# Patient Record
Sex: MALE | Race: WHITE | NOT HISPANIC OR LATINO | Employment: FULL TIME | URBAN - NONMETROPOLITAN AREA
[De-identification: names, ages, dates, MRNs, and addresses within clinical notes are randomized per-mention and may not be internally consistent; named-entity substitution may affect disease eponyms.]

---

## 2022-04-19 ENCOUNTER — TELEPHONE (OUTPATIENT)
Dept: FAMILY MEDICINE CLINIC | Facility: CLINIC | Age: 57
End: 2022-04-19

## 2022-06-03 RX ORDER — AMLODIPINE BESYLATE 5 MG/1
TABLET ORAL
Qty: 90 TABLET | Refills: 0 | Status: SHIPPED | OUTPATIENT
Start: 2022-06-03 | End: 2022-09-06

## 2022-06-03 RX ORDER — MONTELUKAST SODIUM 10 MG/1
TABLET ORAL
Qty: 90 TABLET | Refills: 0 | Status: SHIPPED | OUTPATIENT
Start: 2022-06-03 | End: 2022-10-05

## 2022-09-01 RX ORDER — MONTELUKAST SODIUM 10 MG/1
TABLET ORAL
Qty: 90 TABLET | Refills: 0 | OUTPATIENT
Start: 2022-09-01

## 2022-09-01 RX ORDER — OMEPRAZOLE 20 MG/1
CAPSULE, DELAYED RELEASE ORAL
Qty: 90 CAPSULE | Refills: 0 | Status: SHIPPED | OUTPATIENT
Start: 2022-09-01 | End: 2022-10-05 | Stop reason: SDUPTHER

## 2022-09-01 RX ORDER — AMLODIPINE BESYLATE 5 MG/1
TABLET ORAL
Qty: 90 TABLET | Refills: 0 | OUTPATIENT
Start: 2022-09-01

## 2022-09-06 RX ORDER — AMLODIPINE BESYLATE 5 MG/1
TABLET ORAL
Qty: 30 TABLET | Refills: 0 | Status: SHIPPED | OUTPATIENT
Start: 2022-09-06 | End: 2022-10-05 | Stop reason: SDUPTHER

## 2022-10-05 ENCOUNTER — OFFICE VISIT (OUTPATIENT)
Dept: FAMILY MEDICINE CLINIC | Facility: CLINIC | Age: 57
End: 2022-10-05

## 2022-10-05 VITALS
BODY MASS INDEX: 26.84 KG/M2 | OXYGEN SATURATION: 97 % | WEIGHT: 171 LBS | HEIGHT: 67 IN | DIASTOLIC BLOOD PRESSURE: 86 MMHG | SYSTOLIC BLOOD PRESSURE: 126 MMHG | HEART RATE: 68 BPM

## 2022-10-05 DIAGNOSIS — D17.1 LIPOMA OF LATERAL CHEST WALL: ICD-10-CM

## 2022-10-05 DIAGNOSIS — Z13.1 DIABETES MELLITUS SCREENING: ICD-10-CM

## 2022-10-05 DIAGNOSIS — K21.9 GERD WITHOUT ESOPHAGITIS: ICD-10-CM

## 2022-10-05 DIAGNOSIS — M54.2 CERVICALGIA: ICD-10-CM

## 2022-10-05 DIAGNOSIS — I10 HYPERTENSION, ESSENTIAL: ICD-10-CM

## 2022-10-05 DIAGNOSIS — E78.2 HYPERLIPIDEMIA, MIXED: ICD-10-CM

## 2022-10-05 DIAGNOSIS — Z00.00 GENERAL MEDICAL EXAM: Primary | ICD-10-CM

## 2022-10-05 DIAGNOSIS — Z11.59 NEED FOR HEPATITIS C SCREENING TEST: ICD-10-CM

## 2022-10-05 DIAGNOSIS — Z12.5 PROSTATE CANCER SCREENING: ICD-10-CM

## 2022-10-05 DIAGNOSIS — Z12.11 SCREENING FOR COLON CANCER: ICD-10-CM

## 2022-10-05 PROBLEM — J30.1 SEASONAL ALLERGIC RHINITIS DUE TO POLLEN: Status: RESOLVED | Noted: 2022-10-05 | Resolved: 2022-10-05

## 2022-10-05 PROBLEM — J45.20 MILD INTERMITTENT ASTHMA WITHOUT COMPLICATION: Status: RESOLVED | Noted: 2022-10-05 | Resolved: 2022-10-05

## 2022-10-05 PROBLEM — J45.20 MILD INTERMITTENT ASTHMA WITHOUT COMPLICATION: Status: ACTIVE | Noted: 2022-10-05

## 2022-10-05 PROBLEM — J30.1 SEASONAL ALLERGIC RHINITIS DUE TO POLLEN: Status: ACTIVE | Noted: 2022-10-05

## 2022-10-05 PROBLEM — J30.9 ALLERGIC RHINITIS: Status: ACTIVE | Noted: 2022-10-05

## 2022-10-05 PROCEDURE — 36415 COLL VENOUS BLD VENIPUNCTURE: CPT | Performed by: FAMILY MEDICINE

## 2022-10-05 PROCEDURE — 99396 PREV VISIT EST AGE 40-64: CPT | Performed by: FAMILY MEDICINE

## 2022-10-05 RX ORDER — AMLODIPINE BESYLATE 5 MG/1
5 TABLET ORAL DAILY
Qty: 90 TABLET | Refills: 1 | Status: SHIPPED | OUTPATIENT
Start: 2022-10-05 | End: 2023-02-14 | Stop reason: SDUPTHER

## 2022-10-05 RX ORDER — OMEPRAZOLE 20 MG/1
20 CAPSULE, DELAYED RELEASE ORAL DAILY
Qty: 90 CAPSULE | Refills: 1 | Status: SHIPPED | OUTPATIENT
Start: 2022-10-05 | End: 2023-02-14 | Stop reason: SDUPTHER

## 2022-10-05 RX ORDER — MELOXICAM 15 MG/1
TABLET ORAL
Qty: 30 TABLET | Refills: 3 | Status: SHIPPED | OUTPATIENT
Start: 2022-10-05 | End: 2023-02-14 | Stop reason: SDUPTHER

## 2022-10-05 NOTE — ASSESSMENT & PLAN NOTE
Reviewed health maintenance and screening.    Ordered Cologuard.  Declines colonoscopy.    Awaiting fasting labs with screening PSA and hep C screening.

## 2022-10-05 NOTE — ASSESSMENT & PLAN NOTE
Doing well with Norvasc.    Initial blood pressure was up but we rechecked this mid visit and it improved significantly.  He does continue with blood pressure checks at home periodically and we discussed goal of keeping this under 140/90.

## 2022-10-05 NOTE — PROGRESS NOTES
"Chief Complaint  Hypertension (Needing medication refilled)    Subjective    History of Present Illness:  Edward Knowles is a 56 y.o. male who presents today for physical exam and medication refills.    He is fasting to get past-due blood work up-to-date.  It has been about 3 years since his last labs due to the COVID pandemic.    His lipids were elevated with last labs and he would like to work on diet and exercise before medications if possible.    GERD stable with omeprazole.  No dysphagia.    Using Mobic infrequently for neck pain.  Understands long-term risks with NSAIDs and Mobic.  Understands not to take any other NSAIDs over-the-counter if he is using Mobic.    Declines vaccination update-discussed flu, Shingrix, Prevnar 20, Tdap, and COVID 19    Declines colonoscopy but willing to get Cologuard done.    Would like screening PSA with fasting labs.    Willing to get hep C screening done.    Objective   Vital Signs:   /86   Pulse 68   Ht 170.2 cm (67\")   Wt 77.6 kg (171 lb)   SpO2 97%   BMI 26.78 kg/m²     Review of Systems   Constitutional: Negative for appetite change, chills and fever.   HENT: Negative for hearing loss.    Eyes: Negative for blurred vision.   Respiratory: Negative for chest tightness.    Cardiovascular: Negative for chest pain.   Gastrointestinal: Negative for abdominal pain.   Musculoskeletal: Positive for arthralgias and neck pain. Negative for gait problem.   Skin: Negative for rash.   Psychiatric/Behavioral: Negative for depressed mood.       Past History:  Medical History: has a past medical history of Allergic rhinitis, Benign essential hypertension, Blood chemistry abnormality, Erectile dysfunction, Gastroesophageal reflux, and Mixed hyperlipidemia.   Surgical History: has no past surgical history on file.   Family History: family history includes Other in his father.   Social History: reports that he has never smoked. He has never used smokeless tobacco. He reports current " alcohol use. He reports that he does not use drugs.      Current Outpatient Medications:   •  amLODIPine (NORVASC) 5 MG tablet, Take 1 tablet by mouth Daily. for blood pressure, Disp: 90 tablet, Rfl: 1  •  omeprazole (priLOSEC) 20 MG capsule, Take 1 capsule by mouth Daily., Disp: 90 capsule, Rfl: 1  •  meloxicam (Mobic) 15 MG tablet, 1/2 - 1 po daily prn arthritis/joint pain, Disp: 30 tablet, Rfl: 3    Allergies: Patient has no known allergies.    Physical Exam  Constitutional:       Appearance: Normal appearance.   HENT:      Head: Normocephalic.      Right Ear: External ear normal.      Left Ear: External ear normal.      Nose: Nose normal.   Eyes:      Pupils: Pupils are equal, round, and reactive to light.   Cardiovascular:      Rate and Rhythm: Normal rate and regular rhythm.      Heart sounds: Normal heart sounds.   Pulmonary:      Effort: Pulmonary effort is normal.      Breath sounds: Normal breath sounds.   Musculoskeletal:         General: Normal range of motion.      Cervical back: Normal range of motion.   Skin:     Comments: Left lateral chest wall with 1 to 2 cm soft tissue mass suggestive for lipoma versus sebaceous cyst.  No concerning findings on exam for malignancy.    It is not causing him any pain he would like to continue to monitor it at this time instead of have general surgery excise it.   Neurological:      General: No focal deficit present.      Mental Status: He is alert.      Comments: Full cervical range of motion.  Mild lower cervical pain along rhomboid area stable with as needed use of Mobic   Psychiatric:         Mood and Affect: Mood normal.         Behavior: Behavior normal.         Thought Content: Thought content normal.          Result Review                   Assessment and Plan  Diagnoses and all orders for this visit:    1. General medical exam (Primary)  Assessment & Plan:  Reviewed health maintenance and screening.    Ordered Cologuard.  Declines colonoscopy.    Awaiting  fasting labs with screening PSA and hep C screening.    Orders:  -     CBC Auto Differential; Future  -     Comprehensive Metabolic Panel; Future  -     Lipid Panel; Future  -     TSH; Future  -     T4, Free; Future    2. Prostate cancer screening  -     PSA Screen; Future    3. Screening for colon cancer  -     Cologuard - Stool, Per Rectum; Future    4. Need for hepatitis C screening test  -     HCV Antibody Rfx To Qnt PCR; Future    5. Hypertension, essential  Assessment & Plan:  Hypertension is improving with treatment.  Continue current treatment regimen.  Blood pressure will be reassessed at the next regular appointment.    Orders:  -     CBC Auto Differential; Future  -     Comprehensive Metabolic Panel; Future  -     Lipid Panel; Future  -     TSH; Future  -     T4, Free; Future  -     amLODIPine (NORVASC) 5 MG tablet; Take 1 tablet by mouth Daily. for blood pressure  Dispense: 90 tablet; Refill: 1    6. Hyperlipidemia, mixed  Assessment & Plan:  Reviewed last labs and discussed cholesterol elevation.  He would like to work on diet and exercise before prescription meds.  Awaiting recheck on fasting labs.    Orders:  -     CBC Auto Differential; Future  -     Comprehensive Metabolic Panel; Future  -     Lipid Panel; Future  -     TSH; Future  -     T4, Free; Future    7. GERD without esophagitis  Assessment & Plan:  Continue omeprazole.  No dysphagia    Orders:  -     omeprazole (priLOSEC) 20 MG capsule; Take 1 capsule by mouth Daily.  Dispense: 90 capsule; Refill: 1    8. Diabetes mellitus screening  -     Hemoglobin A1c; Future    9. Lipoma of lateral chest wall  Assessment & Plan:  Left lateral chest wall lipoma of her sebaceous cyst.  Nontender.    No findings concerning for malignancy on exam.    Given he is not having any symptoms with this he would like to hold off on referral to general surgery for excision.    We will continue to monitor at future visits and if it starts to change in size or causes  symptoms we can get him set up with general surgery for excision      10. Cervicalgia  Assessment & Plan:  Given refill on Mobic.    Discussed NSAID risk and Mobic risk.  He understands not to take NSAIDs over-the-counter with Mobic and will use Mobic infrequently.  He does use omeprazole for GERD so discussed this will help with GI protection with NSAID use.    Orders:  -     meloxicam (Mobic) 15 MG tablet; 1/2 - 1 po daily prn arthritis/joint pain  Dispense: 30 tablet; Refill: 3      BMI is >= 25 and <30. (Overweight) The following options were offered after discussion;: exercise counseling/recommendations and nutrition counseling/recommendations          Follow Up  Return in about 6 months (around 4/5/2023) for Med recheck.    Christopher Bagley MD

## 2022-10-05 NOTE — ASSESSMENT & PLAN NOTE
Reviewed last labs and discussed cholesterol elevation.  He would like to work on diet and exercise before prescription meds.  Awaiting recheck on fasting labs.

## 2022-10-05 NOTE — ASSESSMENT & PLAN NOTE
Left lateral chest wall lipoma of her sebaceous cyst.  Nontender.    No findings concerning for malignancy on exam.    Given he is not having any symptoms with this he would like to hold off on referral to general surgery for excision.    We will continue to monitor at future visits and if it starts to change in size or causes symptoms we can get him set up with general surgery for excision

## 2022-10-05 NOTE — ASSESSMENT & PLAN NOTE
Given refill on Mobic.    Discussed NSAID risk and Mobic risk.  He understands not to take NSAIDs over-the-counter with Mobic and will use Mobic infrequently.  He does use omeprazole for GERD so discussed this will help with GI protection with NSAID use.

## 2022-10-06 DIAGNOSIS — E78.2 HYPERLIPIDEMIA, MIXED: Primary | ICD-10-CM

## 2022-10-06 LAB
ALBUMIN SERPL-MCNC: 4.7 G/DL (ref 3.8–4.9)
ALBUMIN/GLOB SERPL: 1.9 {RATIO} (ref 1.2–2.2)
ALP SERPL-CCNC: 87 IU/L (ref 44–121)
ALT SERPL-CCNC: 30 IU/L (ref 0–44)
AST SERPL-CCNC: 19 IU/L (ref 0–40)
BASOPHILS # BLD AUTO: 0 X10E3/UL (ref 0–0.2)
BASOPHILS NFR BLD AUTO: 0 %
BILIRUB SERPL-MCNC: 0.7 MG/DL (ref 0–1.2)
BUN SERPL-MCNC: 15 MG/DL (ref 6–24)
BUN/CREAT SERPL: 17 (ref 9–20)
CALCIUM SERPL-MCNC: 10 MG/DL (ref 8.7–10.2)
CHLORIDE SERPL-SCNC: 103 MMOL/L (ref 96–106)
CHOLEST SERPL-MCNC: 287 MG/DL (ref 100–199)
CO2 SERPL-SCNC: 20 MMOL/L (ref 20–29)
CREAT SERPL-MCNC: 0.89 MG/DL (ref 0.76–1.27)
EGFRCR SERPLBLD CKD-EPI 2021: 100 ML/MIN/1.73
EOSINOPHIL # BLD AUTO: 0.2 X10E3/UL (ref 0–0.4)
EOSINOPHIL NFR BLD AUTO: 4 %
ERYTHROCYTE [DISTWIDTH] IN BLOOD BY AUTOMATED COUNT: 13.2 % (ref 11.6–15.4)
GLOBULIN SER CALC-MCNC: 2.5 G/DL (ref 1.5–4.5)
GLUCOSE SERPL-MCNC: 89 MG/DL (ref 70–99)
HBA1C MFR BLD: 5.6 % (ref 4.8–5.6)
HCT VFR BLD AUTO: 47.5 % (ref 37.5–51)
HCV AB S/CO SERPL IA: <0.1 S/CO RATIO (ref 0–0.9)
HCV AB SERPL QL IA: NORMAL
HDLC SERPL-MCNC: 57 MG/DL
HGB BLD-MCNC: 16.3 G/DL (ref 13–17.7)
IMM GRANULOCYTES # BLD AUTO: 0 X10E3/UL (ref 0–0.1)
IMM GRANULOCYTES NFR BLD AUTO: 0 %
LDLC SERPL CALC-MCNC: 191 MG/DL (ref 0–99)
LYMPHOCYTES # BLD AUTO: 2 X10E3/UL (ref 0.7–3.1)
LYMPHOCYTES NFR BLD AUTO: 37 %
MCH RBC QN AUTO: 30.7 PG (ref 26.6–33)
MCHC RBC AUTO-ENTMCNC: 34.3 G/DL (ref 31.5–35.7)
MCV RBC AUTO: 90 FL (ref 79–97)
MONOCYTES # BLD AUTO: 0.5 X10E3/UL (ref 0.1–0.9)
MONOCYTES NFR BLD AUTO: 9 %
NEUTROPHILS # BLD AUTO: 2.7 X10E3/UL (ref 1.4–7)
NEUTROPHILS NFR BLD AUTO: 50 %
PLATELET # BLD AUTO: 239 X10E3/UL (ref 150–450)
POTASSIUM SERPL-SCNC: 4.3 MMOL/L (ref 3.5–5.2)
PROT SERPL-MCNC: 7.2 G/DL (ref 6–8.5)
PSA SERPL-MCNC: 2.5 NG/ML (ref 0–4)
RBC # BLD AUTO: 5.31 X10E6/UL (ref 4.14–5.8)
SODIUM SERPL-SCNC: 140 MMOL/L (ref 134–144)
T4 FREE SERPL-MCNC: 1.19 NG/DL (ref 0.82–1.77)
TRIGL SERPL-MCNC: 204 MG/DL (ref 0–149)
TSH SERPL DL<=0.005 MIU/L-ACNC: 1.16 UIU/ML (ref 0.45–4.5)
VLDLC SERPL CALC-MCNC: 39 MG/DL (ref 5–40)
WBC # BLD AUTO: 5.4 X10E3/UL (ref 3.4–10.8)

## 2022-10-06 RX ORDER — ROSUVASTATIN CALCIUM 10 MG/1
10 TABLET, COATED ORAL DAILY
Qty: 90 TABLET | Refills: 1 | Status: SHIPPED | OUTPATIENT
Start: 2022-10-06 | End: 2023-02-04

## 2022-11-21 RX ORDER — MONTELUKAST SODIUM 10 MG/1
TABLET ORAL
Qty: 90 TABLET | Refills: 1 | Status: SHIPPED | OUTPATIENT
Start: 2022-11-21 | End: 2023-02-04

## 2023-01-11 ENCOUNTER — PATIENT MESSAGE (OUTPATIENT)
Dept: FAMILY MEDICINE CLINIC | Facility: CLINIC | Age: 58
End: 2023-01-11
Payer: COMMERCIAL

## 2023-01-11 DIAGNOSIS — M54.2 CERVICALGIA: Primary | ICD-10-CM

## 2023-01-11 NOTE — TELEPHONE ENCOUNTER
From: Edward Knowles  To: Christopher Bagley  Sent: 1/11/2023 8:02 AM EST  Subject: Continued shoulder/neck pain    I would like to know the details of doing an MRI on my shoulder/neck to locate the source of my pain.   The therapy helped but I still have localized pain in shoulder/ neck area. If it is an open type machine and too long of a process I would like to schedule it. Thanks Edward Knowles 1/11/2023.

## 2023-01-20 DIAGNOSIS — M54.12 CERVICAL RADICULITIS: Primary | ICD-10-CM

## 2023-01-20 DIAGNOSIS — M50.90 CERVICAL DISC DISEASE: ICD-10-CM

## 2023-01-26 ENCOUNTER — PATIENT MESSAGE (OUTPATIENT)
Dept: FAMILY MEDICINE CLINIC | Facility: CLINIC | Age: 58
End: 2023-01-26
Payer: COMMERCIAL

## 2023-01-26 DIAGNOSIS — M50.90 CERVICAL DISC DISEASE: ICD-10-CM

## 2023-01-26 DIAGNOSIS — M54.2 CERVICALGIA: ICD-10-CM

## 2023-01-26 DIAGNOSIS — M54.12 CERVICAL RADICULITIS: Primary | ICD-10-CM

## 2023-01-26 NOTE — TELEPHONE ENCOUNTER
From: Edward Knowles  To: Christopher Juancarlos Kevyn  Sent: 1/26/2023 1:18 PM EST  Subject: Dr Lamas /[place]     I would like to schedule a referral visit with Dr Khanh Lamas when he is available at his [place] office. Please have them call me please.   It is to review the MRI results and discuss options .  He works in [place] a few days each month I am told.   Thanks Edward Knowles. [phone]

## 2023-02-03 ENCOUNTER — TELEPHONE (OUTPATIENT)
Dept: FAMILY MEDICINE CLINIC | Facility: CLINIC | Age: 58
End: 2023-02-03
Payer: COMMERCIAL

## 2023-02-03 NOTE — TELEPHONE ENCOUNTER
Caller: ARA HOANG    Relationship: Emergency Contact    Best call back number: [phone]    What is the best time to reach you: ANY    Who are you requesting to speak with (clinical staff, provider,  specific staff member): CLINICAL    What was the call regarding: THE PATIENT'S SPOUSE CALLING TO INFORM DR. MEDINA THAT THE PATIENT WILL BE USING [facility].    Do you require a callback: IF NEEDED.

## 2023-02-04 ENCOUNTER — TELEMEDICINE (OUTPATIENT)
Dept: FAMILY MEDICINE CLINIC | Facility: CLINIC | Age: 58
End: 2023-02-04
Payer: COMMERCIAL

## 2023-02-04 DIAGNOSIS — J40 BRONCHITIS: Primary | ICD-10-CM

## 2023-02-04 PROCEDURE — 99213 OFFICE O/P EST LOW 20 MIN: CPT | Performed by: FAMILY MEDICINE

## 2023-02-04 RX ORDER — BENZONATATE 100 MG/1
100 CAPSULE ORAL 3 TIMES DAILY PRN
Qty: 30 CAPSULE | Refills: 1 | Status: SHIPPED | OUTPATIENT
Start: 2023-02-04 | End: 2023-03-01

## 2023-02-04 RX ORDER — ALBUTEROL SULFATE 90 UG/1
2 AEROSOL, METERED RESPIRATORY (INHALATION) EVERY 4 HOURS PRN
Qty: 18 G | Refills: 0 | Status: SHIPPED | OUTPATIENT
Start: 2023-02-04 | End: 2023-02-14 | Stop reason: SDUPTHER

## 2023-02-04 RX ORDER — AMOXICILLIN 875 MG/1
875 TABLET, COATED ORAL 2 TIMES DAILY
Qty: 20 TABLET | Refills: 0 | Status: SHIPPED | OUTPATIENT
Start: 2023-02-04 | End: 2023-02-13

## 2023-02-04 NOTE — PROGRESS NOTES
Chief Complaint  Sinusitis    Subjective          Edward Knowles presents to [facility]  History of Present Illness  Patient comes in today says been sick for about 3 days his wife's been sick to his been cough congestion his throats been scratchy says he does not feel very good his wife had this about 5 days earlier and says that now she has been sick as well he has not tested for COVID at this time.      Objective   Vital Signs:   There were no vitals taken for this visit.    There is no height or weight on file to calculate BMI.    Review of Systems   Constitutional: Positive for chills.   HENT: Positive for rhinorrhea and sinus pressure. Negative for congestion, dental problem, ear discharge, ear pain and sore throat.    Respiratory: Positive for cough. Negative for apnea, chest tightness and shortness of breath.    Gastrointestinal: Negative for constipation and nausea.   Endocrine: Negative for polyuria.   Genitourinary: Negative for difficulty urinating.   Musculoskeletal: Negative for arthralgias and gait problem.   Skin: Negative for rash.   Hematological: Negative for adenopathy.       Past History:  Medical History: has a past medical history of Allergic rhinitis, Benign essential hypertension, Blood chemistry abnormality, Erectile dysfunction, Gastroesophageal reflux, and Mixed hyperlipidemia.   Surgical History: has no past surgical history on file.         Current Outpatient Medications:   •  amLODIPine (NORVASC) 5 MG tablet, Take 1 tablet by mouth Daily. for blood pressure, Disp: 90 tablet, Rfl: 1  •  meloxicam (Mobic) 15 MG tablet, 1/2 - 1 po daily prn arthritis/joint pain, Disp: 30 tablet, Rfl: 3  •  omeprazole (priLOSEC) 20 MG capsule, Take 1 capsule by mouth Daily., Disp: 90 capsule, Rfl: 1  •  albuterol sulfate  (90 Base) MCG/ACT inhaler, Inhale 2 puffs Every 4 (Four) Hours As Needed for Wheezing., Disp: 18 g, Rfl: 0  •  amoxicillin (AMOXIL) 875 MG tablet, Take  1 tablet by mouth 2 (Two) Times a Day., Disp: 20 tablet, Rfl: 0  •  benzonatate (Tessalon Perles) 100 MG capsule, Take 1 capsule by mouth 3 (Three) Times a Day As Needed for Cough., Disp: 30 capsule, Rfl: 1    Allergies: Patient has no known allergies.    Physical Exam  Constitutional:       Appearance: Normal appearance.   HENT:      Head: Normocephalic.      Right Ear: External ear normal.      Left Ear: External ear normal.      Nose: Nose normal.      Mouth/Throat:      Mouth: Mucous membranes are moist.   Eyes:      Pupils: Pupils are equal, round, and reactive to light.   Pulmonary:      Effort: Pulmonary effort is normal.   Neurological:      General: No focal deficit present.      Mental Status: He is alert and oriented to person, place, and time.   Psychiatric:         Mood and Affect: Mood normal.          Result Review :                   Assessment and Plan    Diagnoses and all orders for this visit:    1. Bronchitis (Primary)  Comments:  Amoxil Tessalon albuterol inhaler went ahead and discussed with him about possibly taking COVID test because his wife was sick as well   Orders:  -     albuterol sulfate  (90 Base) MCG/ACT inhaler; Inhale 2 puffs Every 4 (Four) Hours As Needed for Wheezing.  Dispense: 18 g; Refill: 0  -     benzonatate (Tessalon Perles) 100 MG capsule; Take 1 capsule by mouth 3 (Three) Times a Day As Needed for Cough.  Dispense: 30 capsule; Refill: 1  -     amoxicillin (AMOXIL) 875 MG tablet; Take 1 tablet by mouth 2 (Two) Times a Day.  Dispense: 20 tablet; Refill: 0              Follow Up   No follow-ups on file.  Patient was given instructions and counseling regarding his condition or for health maintenance advice. Please see specific information pulled into the AVS if appropriate.     Ke Rodriguez MD

## 2023-02-13 ENCOUNTER — TELEPHONE (OUTPATIENT)
Dept: FAMILY MEDICINE CLINIC | Facility: CLINIC | Age: 58
End: 2023-02-13

## 2023-02-13 RX ORDER — METHYLPREDNISOLONE 4 MG/1
TABLET ORAL
Qty: 10 TABLET | Refills: 0 | Status: SHIPPED | OUTPATIENT
Start: 2023-02-13 | End: 2023-02-20

## 2023-02-13 RX ORDER — AZITHROMYCIN 250 MG/1
TABLET, FILM COATED ORAL
Qty: 6 TABLET | Refills: 0 | Status: SHIPPED | OUTPATIENT
Start: 2023-02-13 | End: 2023-02-18

## 2023-02-13 NOTE — TELEPHONE ENCOUNTER
PHONE CALL FROM PATIENT.  HIS BRONCHITIS HAS GOTTEN WORSE.  NOW COUGHING YELLOW/GREEN MUCUS AND FOUL TASTE.  TIGHTNESS IN CHEST.  WHAT TO DO?  OXYGEN LEVEL 95, TEMP 96.  NEG COVID TEST.      PLEASE CALL [phone]

## 2023-02-14 DIAGNOSIS — J40 BRONCHITIS: ICD-10-CM

## 2023-02-14 DIAGNOSIS — I10 HYPERTENSION, ESSENTIAL: ICD-10-CM

## 2023-02-14 DIAGNOSIS — M54.2 CERVICALGIA: ICD-10-CM

## 2023-02-14 DIAGNOSIS — K21.9 GERD WITHOUT ESOPHAGITIS: ICD-10-CM

## 2023-02-14 RX ORDER — MELOXICAM 15 MG/1
TABLET ORAL
Qty: 30 TABLET | Refills: 0 | Status: SHIPPED | OUTPATIENT
Start: 2023-02-14 | End: 2023-03-01

## 2023-02-14 RX ORDER — AMLODIPINE BESYLATE 5 MG/1
5 TABLET ORAL DAILY
Qty: 90 TABLET | Refills: 0 | Status: SHIPPED | OUTPATIENT
Start: 2023-02-14 | End: 2023-03-31

## 2023-02-14 RX ORDER — ALBUTEROL SULFATE 90 UG/1
2 AEROSOL, METERED RESPIRATORY (INHALATION) EVERY 4 HOURS PRN
Qty: 18 G | Refills: 0 | Status: SHIPPED | OUTPATIENT
Start: 2023-02-14

## 2023-02-14 RX ORDER — OMEPRAZOLE 20 MG/1
20 CAPSULE, DELAYED RELEASE ORAL DAILY
Qty: 90 CAPSULE | Refills: 1 | Status: SHIPPED | OUTPATIENT
Start: 2023-02-14 | End: 2023-04-05 | Stop reason: SDUPTHER

## 2023-02-14 NOTE — TELEPHONE ENCOUNTER
Caller: [facility] - [address] - [phone] [facility] [phone] FX    Relationship: Pharmacy    Best call back number: [phone]    Requested Prescriptions:   Requested Prescriptions     Pending Prescriptions Disp Refills   • amLODIPine (NORVASC) 5 MG tablet 90 tablet 1     Sig: Take 1 tablet by mouth Daily. for blood pressure   • meloxicam (Mobic) 15 MG tablet 30 tablet 3     Si/2 - 1 po daily prn arthritis/joint pain   • omeprazole (priLOSEC) 20 MG capsule 90 capsule 1     Sig: Take 1 capsule by mouth Daily.   • albuterol sulfate  (90 Base) MCG/ACT inhaler 18 g 0     Sig: Inhale 2 puffs Every 4 (Four) Hours As Needed for Wheezing.        Pharmacy where request should be sent: [facility] - [place] [phone] [facility] [phone] FX          Does the patient have less than a 3 day supply:  [x] Yes  [] No    Would you like a call back once the refill request has been completed: [] Yes [x] No    If the office needs to give you a call back, can they leave a voicemail: [] Yes [x] No    Giovani Galvin Rep   23 09:27 EST

## 2023-03-01 ENCOUNTER — OFFICE VISIT (OUTPATIENT)
Dept: FAMILY MEDICINE CLINIC | Facility: CLINIC | Age: 58
End: 2023-03-01
Payer: COMMERCIAL

## 2023-03-01 VITALS
HEIGHT: 67 IN | HEART RATE: 86 BPM | WEIGHT: 168 LBS | SYSTOLIC BLOOD PRESSURE: 148 MMHG | OXYGEN SATURATION: 96 % | DIASTOLIC BLOOD PRESSURE: 90 MMHG | BODY MASS INDEX: 26.37 KG/M2

## 2023-03-01 DIAGNOSIS — I10 HYPERTENSION, ESSENTIAL: ICD-10-CM

## 2023-03-01 DIAGNOSIS — J20.8 ACUTE BRONCHITIS DUE TO OTHER SPECIFIED ORGANISMS: Primary | ICD-10-CM

## 2023-03-01 PROCEDURE — 99214 OFFICE O/P EST MOD 30 MIN: CPT | Performed by: PHYSICIAN ASSISTANT

## 2023-03-01 RX ORDER — ALBUTEROL SULFATE 2.5 MG/3ML
2.5 SOLUTION RESPIRATORY (INHALATION) EVERY 4 HOURS PRN
Qty: 100 ML | Refills: 12 | Status: SHIPPED | OUTPATIENT
Start: 2023-03-01

## 2023-03-01 RX ORDER — DEXTROMETHORPHAN HYDROBROMIDE AND PROMETHAZINE HYDROCHLORIDE 15; 6.25 MG/5ML; MG/5ML
SOLUTION ORAL 4 TIMES DAILY PRN
COMMUNITY
End: 2023-04-05

## 2023-03-01 NOTE — ASSESSMENT & PLAN NOTE
Advised patient that this is likely viral bronchitis.  Patient provided with order for nebulizer and tubing.  Prescribed albuterol for neb machine.  Crease fluids as tolerated and continue to use Flonase.  Call if any problems arise

## 2023-03-01 NOTE — ASSESSMENT & PLAN NOTE
Patient's blood pressure is elevated this date.  Advised patient that he needs to start checking his blood pressure regularly.  Provided patient with blood pressure log discussed parameters of hypertension.  Recommended patient return to clinic should it stay elevated.  Patient knowledge understanding.

## 2023-03-01 NOTE — PROGRESS NOTES
"Chief Complaint  Bronchitis    Subjective        Edward Knowles presents to [facility]  History of Present Illness  Patient reports today secondary to having bronchitis for the past month.  Patient reports he has taken 2 rounds of antibiotics and 1 round of steroids.  Patient states on Monday he developed burning sensation in his chest so he went to ER.  Reports that chest x-ray was normal and heart tests were normal.  Patient reports he was provided with a neb treatment and the pain went away and his breathing improved.  Patient reports feeling like he can now take deeper breaths.  Patient reports having an albuterol inhaler however states it did not provide the same relief.  Patient reports he was advised to take cough syrup and give it time.  Patient would like to have nebulizer machine for his home.      Objective   Vital Signs:  /90 (BP Location: Right arm, Patient Position: Sitting, Cuff Size: Large Adult)   Pulse 86   Ht 170.2 cm (67\")   Wt 76.2 kg (168 lb)   SpO2 96%   BMI 26.31 kg/m²   Estimated body mass index is 26.31 kg/m² as calculated from the following:    Height as of this encounter: 170.2 cm (67\").    Weight as of this encounter: 76.2 kg (168 lb).             Physical Exam  Vitals and nursing note reviewed.   Constitutional:       General: He is not in acute distress.     Appearance: Normal appearance.   HENT:      Head: Normocephalic.      Right Ear: Hearing normal.      Left Ear: Hearing normal.   Eyes:      Pupils: Pupils are equal, round, and reactive to light.   Cardiovascular:      Rate and Rhythm: Normal rate and regular rhythm.   Pulmonary:      Effort: Pulmonary effort is normal. No respiratory distress.      Breath sounds: Wheezing present.      Comments: Mild expiratory wheeze right upper lobe  Skin:     General: Skin is warm and dry.   Neurological:      Mental Status: He is alert.   Psychiatric:         Mood and Affect: Mood normal.        Result " Review :                   Assessment and Plan   Diagnoses and all orders for this visit:    1. Acute bronchitis due to other specified organisms (Primary)  Assessment & Plan:  Advised patient that this is likely viral bronchitis.  Patient provided with order for nebulizer and tubing.  Prescribed albuterol for neb machine.  Crease fluids as tolerated and continue to use Flonase.  Call if any problems arise    Orders:  -     Nebulizers (Compressor Nebulizer) misc; 1 each 4 (Four) Times a Day. Nebulizer and supplies (mask and tubing)  Dispense: 1 each; Refill: 0  -     albuterol (PROVENTIL) (2.5 MG/3ML) 0.083% nebulizer solution; Take 2.5 mg by nebulization Every 4 (Four) Hours As Needed for Wheezing or Shortness of Air.  Dispense: 100 mL; Refill: 12    2. Hypertension, essential  Assessment & Plan:  Patient's blood pressure is elevated this date.  Advised patient that he needs to start checking his blood pressure regularly.  Provided patient with blood pressure log discussed parameters of hypertension.  Recommended patient return to clinic should it stay elevated.  Patient knowledge understanding.             Follow Up   No follow-ups on file.  Patient was given instructions and counseling regarding his condition or for health maintenance advice. Please see specific information pulled into the AVS if appropriate.

## 2023-03-09 DIAGNOSIS — J20.8 ACUTE BRONCHITIS DUE TO OTHER SPECIFIED ORGANISMS: ICD-10-CM

## 2023-03-09 DIAGNOSIS — J20.8 ACUTE BRONCHITIS DUE TO OTHER SPECIFIED ORGANISMS: Primary | ICD-10-CM

## 2023-03-09 RX ORDER — BENZONATATE 100 MG/1
100 CAPSULE ORAL 3 TIMES DAILY PRN
Qty: 30 CAPSULE | Refills: 1 | Status: SHIPPED | OUTPATIENT
Start: 2023-03-09 | End: 2023-03-09 | Stop reason: SDUPTHER

## 2023-03-09 RX ORDER — PREDNISONE 20 MG/1
TABLET ORAL
Qty: 20 TABLET | Refills: 0 | Status: CANCELLED | OUTPATIENT
Start: 2023-03-09 | End: 2023-03-20

## 2023-03-09 RX ORDER — PREDNISONE 20 MG/1
TABLET ORAL
Qty: 20 TABLET | Refills: 0 | Status: SHIPPED | OUTPATIENT
Start: 2023-03-09 | End: 2023-03-20

## 2023-03-09 RX ORDER — BENZONATATE 100 MG/1
100 CAPSULE ORAL 3 TIMES DAILY PRN
Qty: 30 CAPSULE | Refills: 1 | Status: SHIPPED | OUTPATIENT
Start: 2023-03-09 | End: 2023-04-05

## 2023-03-09 RX ORDER — PREDNISONE 20 MG/1
TABLET ORAL
Qty: 20 TABLET | Refills: 0 | Status: SHIPPED | OUTPATIENT
Start: 2023-03-09 | End: 2023-03-09 | Stop reason: SDUPTHER

## 2023-03-09 RX ORDER — BENZONATATE 100 MG/1
100 CAPSULE ORAL 3 TIMES DAILY PRN
Qty: 30 CAPSULE | Refills: 1 | Status: CANCELLED | OUTPATIENT
Start: 2023-03-09

## 2023-03-09 NOTE — TELEPHONE ENCOUNTER
Caller: KnowlesEdward    Relationship: Self    Best call back number: [phone]    Requested Prescriptions:  PREDNISONE  [facility]      Pharmacy where request should be sent: [facility]/PHARMACY #[id number] - [place], [place] - [address] - [phone]  - [phone] FX     Additional details provided by patient: PLEASE SEND TO [facility]. THE PATIENT STATES THAT [facility] DOES NOT HAVE THESE PRESCRIPTIONS.     Does the patient have less than a 3 day supply:  [x] Yes  [] No    Giovani Singh Rep   03/09/23 12:34 EST     THANK YOU.

## 2023-03-09 NOTE — TELEPHONE ENCOUNTER
Caller: Edward Knowles    Relationship: Self    Best call back number: [phone]    Requested Prescriptions:   Requested Prescriptions     Pending Prescriptions Disp Refills   • predniSONE (DELTASONE) 20 MG tablet 20 tablet 0     Sig: Take 3 tablets by mouth Daily for 3 days, THEN 2 tablets Daily for 3 days, THEN 1 tablet Daily for 5 days. With food   • benzonatate (Tessalon Perles) 100 MG capsule 30 capsule 1     Sig: Take 1 capsule by mouth 3 (Three) Times a Day As Needed for Cough.        Pharmacy where request should be sent:    [facility]/pharmacy #[id number] - [facility] [address] - [phone]  - [phone] FX    Does the patient have less than a 3 day supply:  [x] Yes  [] No    Would you like a call back once the refill request has been completed: [x] Yes [] No    If the office needs to give you a call back, can they leave a voicemail: [] Yes [] No    Giovani Franz Rep   03/09/23 15:43 EST

## 2023-03-22 RX ORDER — FLUTICASONE PROPIONATE 50 MCG
2 SPRAY, SUSPENSION (ML) NASAL DAILY
COMMUNITY
End: 2023-03-22 | Stop reason: SDUPTHER

## 2023-03-22 NOTE — TELEPHONE ENCOUNTER
Caller: Edward Knowles    Relationship: Self    Best call back number:[phone]    Requested Prescriptions:   Requested Prescriptions     Pending Prescriptions Disp Refills   • fluticasone (FLONASE) 50 MCG/ACT nasal spray       Si sprays into the nostril(s) as directed by provider Daily.        Pharmacy where request should be sent: [facility] [address] [phone] [facility] [phone]      Last office visit with prescribing clinician: 10/5/2022   Last telemedicine visit with prescribing clinician: 2023   Next office visit with prescribing clinician: 2023     Additional details provided by patient: PLEASE SEND IN 3 MONTH SUPPLY     Does the patient have less than a 3 day supply:  [] Yes  [x] No    Would you like a call back once the refill request has been completed: [] Yes [x] No    If the office needs to give you a call back, can they leave a voicemail: [] Yes [x] No    Giovani Lopez Rep   23 13:32 EDT

## 2023-03-23 RX ORDER — FLUTICASONE PROPIONATE 50 MCG
2 SPRAY, SUSPENSION (ML) NASAL DAILY
Qty: 9.9 ML | Refills: 3 | Status: SHIPPED | OUTPATIENT
Start: 2023-03-23 | End: 2023-04-05 | Stop reason: SDUPTHER

## 2023-03-31 DIAGNOSIS — I10 HYPERTENSION, ESSENTIAL: ICD-10-CM

## 2023-03-31 DIAGNOSIS — M54.2 CERVICALGIA: ICD-10-CM

## 2023-03-31 RX ORDER — MELOXICAM 15 MG/1
TABLET ORAL
Qty: 90 TABLET | Refills: 0 | Status: SHIPPED | OUTPATIENT
Start: 2023-03-31 | End: 2023-04-05

## 2023-03-31 RX ORDER — AMLODIPINE BESYLATE 5 MG/1
TABLET ORAL
Qty: 90 TABLET | Refills: 0 | Status: SHIPPED | OUTPATIENT
Start: 2023-03-31 | End: 2023-04-05 | Stop reason: SDUPTHER

## 2023-04-05 ENCOUNTER — TELEMEDICINE (OUTPATIENT)
Dept: FAMILY MEDICINE CLINIC | Facility: CLINIC | Age: 58
End: 2023-04-05
Payer: COMMERCIAL

## 2023-04-05 DIAGNOSIS — M54.2 CERVICALGIA: Primary | ICD-10-CM

## 2023-04-05 DIAGNOSIS — K21.9 GERD WITHOUT ESOPHAGITIS: ICD-10-CM

## 2023-04-05 DIAGNOSIS — J45.991 COUGH VARIANT ASTHMA: ICD-10-CM

## 2023-04-05 DIAGNOSIS — I10 HYPERTENSION, ESSENTIAL: ICD-10-CM

## 2023-04-05 DIAGNOSIS — J30.1 SEASONAL ALLERGIC RHINITIS DUE TO POLLEN: ICD-10-CM

## 2023-04-05 PROBLEM — J30.9 ALLERGIC RHINITIS: Chronic | Status: ACTIVE | Noted: 2022-10-05

## 2023-04-05 PROBLEM — Z11.59 NEED FOR HEPATITIS C SCREENING TEST: Status: RESOLVED | Noted: 2022-10-05 | Resolved: 2023-04-05

## 2023-04-05 RX ORDER — MONTELUKAST SODIUM 10 MG/1
10 TABLET ORAL NIGHTLY
Qty: 90 TABLET | Refills: 1 | Status: SHIPPED | OUTPATIENT
Start: 2023-04-05

## 2023-04-05 RX ORDER — FLUTICASONE PROPIONATE 50 MCG
2 SPRAY, SUSPENSION (ML) NASAL DAILY
Qty: 48 G | Refills: 1 | Status: SHIPPED | OUTPATIENT
Start: 2023-04-05

## 2023-04-05 RX ORDER — OMEPRAZOLE 20 MG/1
20 CAPSULE, DELAYED RELEASE ORAL DAILY
Qty: 90 CAPSULE | Refills: 1 | Status: SHIPPED | OUTPATIENT
Start: 2023-04-05

## 2023-04-05 RX ORDER — AMLODIPINE BESYLATE 5 MG/1
5 TABLET ORAL DAILY
Qty: 90 TABLET | Refills: 1 | Status: SHIPPED | OUTPATIENT
Start: 2023-04-05

## 2023-04-05 NOTE — ASSESSMENT & PLAN NOTE
Reviewed together MRI and recommendation for no consultation with neurosurgery after they reviewed his imaging.    He has improved with conservative treatment.  We will continue to monitor

## 2023-04-05 NOTE — PROGRESS NOTES
Patient was seen today through synchronous audio/video technology. Verbal consent was obtained. The patient was located at home. Vitals signs were not obtained due to lack of home monitoring access.     I was located at our [facility] office for this telehealth visit.    Chief Complaint  No chief complaint on file.    History of Present Illness  Edward Knowles is a 57 y.o. male presenting via video-conference today for follow-up visit medication refills.    The cervical MRI did return with degenerative changes but neurosurgery did not feel he was a surgical candidate.  He has worked hard with physical therapy and symptoms have improved.  No longer taking meloxicam.    Home blood pressure checks have been good with amlodipine.    Asthma stable with restart on Singulair.  He does have a home nebulizer to use as needed.    Reflux controlled with omeprazole and continuing to control his reflux has help with asthma flareups.    Allergy stable Flonase    The following portions of the patient's history were reviewed and updated as appropriate: allergies, current medications, past family history, past medical history, past social history, past surgical history and problem list.    Review of Systems   Constitutional: Negative for appetite change, chills, fever and unexpected weight change.   HENT: Negative for hearing loss.    Eyes: Negative for visual disturbance.   Respiratory: Negative for chest tightness, shortness of breath and wheezing.    Cardiovascular: Negative for chest pain, palpitations and leg swelling.   Gastrointestinal: Negative for abdominal pain.   Musculoskeletal: Negative for arthralgias, back pain and gait problem.   Skin: Negative for rash.   Neurological: Negative for dizziness and headaches.   Psychiatric/Behavioral: Negative for agitation and confusion. The patient is not nervous/anxious.        Objective  There were no vitals taken for this visit.    Physical  Exam  Constitutional:       General: He is not in acute distress.     Appearance: Normal appearance. He is not ill-appearing.   HENT:      Head: Normocephalic and atraumatic.      Right Ear: External ear normal.      Left Ear: External ear normal.      Nose: Nose normal.   Eyes:      Extraocular Movements: Extraocular movements intact.      Conjunctiva/sclera: Conjunctivae normal.      Pupils: Pupils are equal, round, and reactive to light.   Pulmonary:      Effort: Pulmonary effort is normal. No respiratory distress.   Musculoskeletal:         General: Normal range of motion.      Cervical back: Normal range of motion.   Skin:     Findings: No rash.   Neurological:      General: No focal deficit present.      Mental Status: He is alert and oriented to person, place, and time.   Psychiatric:         Mood and Affect: Mood normal.         Behavior: Behavior normal.         Thought Content: Thought content normal.           Assessment/Plan   Diagnoses and all orders for this visit:    1. Cervicalgia (Primary)  Assessment & Plan:  Reviewed together MRI and recommendation for no consultation with neurosurgery after they reviewed his imaging.    He has improved with conservative treatment.  We will continue to monitor      2. Hypertension, essential  Assessment & Plan:  Hypertension is improving with treatment.  Continue current treatment regimen.  Blood pressure will be reassessed at the next regular appointment.    Orders:  -     amLODIPine (NORVASC) 5 MG tablet; Take 1 tablet by mouth Daily. for blood pressure  Dispense: 90 tablet; Refill: 1    3. GERD without esophagitis  Assessment & Plan:  Continue omeprazole    Orders:  -     omeprazole (priLOSEC) 20 MG capsule; Take 1 capsule by mouth Daily.  Dispense: 90 capsule; Refill: 1    4. Seasonal allergic rhinitis due to pollen  Assessment & Plan:  Refilled Flonase    Orders:  -     fluticasone (FLONASE) 50 MCG/ACT nasal spray; 2 sprays into the nostril(s) as directed by  provider Daily.  Dispense: 48 g; Refill: 1    5. Cough variant asthma  -     montelukast (Singulair) 10 MG tablet; Take 1 tablet by mouth Every Night.  Dispense: 90 tablet; Refill: 1      BMI is >= 25 and <30. (Overweight) The following options were offered after discussion;: exercise counseling/recommendations and nutrition counseling/recommendations       Return in about 6 months (around 10/5/2023) for Annual physical.    Christopher Bagley MD

## 2023-07-29 ENCOUNTER — PATIENT MESSAGE (OUTPATIENT)
Dept: FAMILY MEDICINE CLINIC | Facility: CLINIC | Age: 58
End: 2023-07-29
Payer: COMMERCIAL

## 2023-07-29 DIAGNOSIS — I10 HYPERTENSION, ESSENTIAL: Primary | ICD-10-CM

## 2023-07-31 RX ORDER — AMLODIPINE BESYLATE 2.5 MG/1
2.5 TABLET ORAL DAILY
Qty: 30 TABLET | Refills: 1 | Status: SHIPPED | OUTPATIENT
Start: 2023-07-31

## 2023-09-21 ENCOUNTER — OFFICE VISIT (OUTPATIENT)
Dept: FAMILY MEDICINE CLINIC | Facility: CLINIC | Age: 58
End: 2023-09-21
Payer: COMMERCIAL

## 2023-09-21 VITALS
HEIGHT: 67 IN | HEART RATE: 79 BPM | WEIGHT: 179 LBS | SYSTOLIC BLOOD PRESSURE: 152 MMHG | DIASTOLIC BLOOD PRESSURE: 96 MMHG | OXYGEN SATURATION: 96 % | BODY MASS INDEX: 28.09 KG/M2

## 2023-09-21 DIAGNOSIS — K21.9 GERD WITHOUT ESOPHAGITIS: Chronic | ICD-10-CM

## 2023-09-21 DIAGNOSIS — J30.1 SEASONAL ALLERGIC RHINITIS DUE TO POLLEN: Chronic | ICD-10-CM

## 2023-09-21 DIAGNOSIS — Z13.1 DIABETES MELLITUS SCREENING: ICD-10-CM

## 2023-09-21 DIAGNOSIS — Z12.5 PROSTATE CANCER SCREENING: ICD-10-CM

## 2023-09-21 DIAGNOSIS — E78.2 HYPERLIPIDEMIA, MIXED: ICD-10-CM

## 2023-09-21 DIAGNOSIS — I10 HYPERTENSION, ESSENTIAL: Primary | Chronic | ICD-10-CM

## 2023-09-21 DIAGNOSIS — E66.3 OVERWEIGHT (BMI 25.0-29.9): ICD-10-CM

## 2023-09-21 RX ORDER — OMEPRAZOLE 20 MG/1
20 CAPSULE, DELAYED RELEASE ORAL DAILY
Qty: 90 CAPSULE | Refills: 1 | Status: SHIPPED | OUTPATIENT
Start: 2023-09-21

## 2023-09-21 RX ORDER — AMLODIPINE AND VALSARTAN 5; 160 MG/1; MG/1
1 TABLET ORAL DAILY
Qty: 30 TABLET | Refills: 1 | Status: SHIPPED | OUTPATIENT
Start: 2023-09-21

## 2023-09-21 NOTE — PROGRESS NOTES
"Chief Complaint  Hypertension (Pt is here for hypertension today. )    Subjective    History of Present Illness:  Edward Knowles is a 57 y.o. male who presents today for problems with blood pressure elevation since our last visit.  He does have several home blood pressure checks that tend to be lower than his in office readings.  However, his home blood pressure checks are still mostly in the 140s over 80s to 90s.  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  Interested in medication change given ongoing problems with blood pressure elevation.    GERD controlled with omeprazole no dysphagia.    Seasonal allergies and mild seasonal asthma stable with use of Flonase and Singulair with albuterol as needed.  No refills needed at this time but he would like to keep these meds active on his chart so we can get refill sent in if needed.    Objective   Vital Signs:   /96   Pulse 79   Ht 170.2 cm (67\")   Wt 81.2 kg (179 lb)   SpO2 96%   BMI 28.04 kg/m²     Review of Systems   Constitutional:  Negative for appetite change, chills and fever.   HENT:  Negative for hearing loss.    Eyes:  Negative for blurred vision.   Respiratory:  Negative for chest tightness.    Cardiovascular:  Negative for chest pain.   Gastrointestinal:  Negative for abdominal pain.   Musculoskeletal:  Negative for gait problem.   Skin:  Negative for rash.   Psychiatric/Behavioral:  Negative for depressed mood.      Past History:  Medical History: has a past medical history of Allergic rhinitis, Benign essential hypertension, Blood chemistry abnormality, Erectile dysfunction, Gastroesophageal reflux, and Mixed hyperlipidemia.   Surgical History: has no past surgical history on file.   Family History: family history includes Other in his father.   Social History: reports that he has never smoked. He has never used smokeless tobacco. He reports current alcohol use. He reports " that he does not use drugs.      Current Outpatient Medications:     fluticasone (FLONASE) 50 MCG/ACT nasal spray, 2 sprays into the nostril(s) as directed by provider Daily., Disp: 48 g, Rfl: 1    omeprazole (priLOSEC) 20 MG capsule, Take 1 capsule by mouth Daily., Disp: 90 capsule, Rfl: 1    albuterol (PROVENTIL) (2.5 MG/3ML) 0.083% nebulizer solution, Take 2.5 mg by nebulization Every 4 (Four) Hours As Needed for Wheezing or Shortness of Air. (Patient not taking: Reported on 9/21/2023), Disp: 100 mL, Rfl: 12    albuterol sulfate  (90 Base) MCG/ACT inhaler, Inhale 2 puffs Every 4 (Four) Hours As Needed for Wheezing. (Patient not taking: Reported on 9/21/2023), Disp: 18 g, Rfl: 0    amLODIPine-valsartan (Exforge) 5-160 MG per tablet, Take 1 tablet by mouth Daily. (Replaces amlodipine), Disp: 30 tablet, Rfl: 1    montelukast (Singulair) 10 MG tablet, Take 1 tablet by mouth Every Night. (Patient not taking: Reported on 9/21/2023), Disp: 90 tablet, Rfl: 1    Nebulizers (Compressor Nebulizer) misc, 1 each 4 (Four) Times a Day. Nebulizer and supplies (mask and tubing) (Patient not taking: Reported on 9/21/2023), Disp: 1 each, Rfl: 0    Allergies: Patient has no known allergies.    Physical Exam  Constitutional:       Appearance: Normal appearance.   HENT:      Head: Normocephalic.      Right Ear: External ear normal.      Left Ear: External ear normal.      Nose: Nose normal.   Eyes:      Pupils: Pupils are equal, round, and reactive to light.   Cardiovascular:      Rate and Rhythm: Normal rate and regular rhythm.      Heart sounds: Normal heart sounds.   Pulmonary:      Effort: Pulmonary effort is normal.      Breath sounds: Normal breath sounds.   Musculoskeletal:         General: Normal range of motion.      Cervical back: Normal range of motion.   Skin:     General: Skin is warm and dry.   Neurological:      General: No focal deficit present.      Mental Status: He is alert.   Psychiatric:         Mood and  Affect: Mood normal.         Behavior: Behavior normal.         Thought Content: Thought content normal.        Result Review                   Assessment and Plan  Diagnoses and all orders for this visit:    1. Hypertension, essential (Primary)  Assessment & Plan:  Hypertension is worsening.  Regular aerobic exercise.  Medication changes per orders.  Blood pressure will be reassessed in 4 weeks.    Reviewed blood pressures from home and discussed repeat blood pressure to remain elevated.  We will change from Norvasc to Exforge.  Recheck at follow-up in 1 month.  He will plan to get fasting labs up-to-date before his follow-up visit    Orders:  -     amLODIPine-valsartan (Exforge) 5-160 MG per tablet; Take 1 tablet by mouth Daily. (Replaces amlodipine)  Dispense: 30 tablet; Refill: 1  -     CBC Auto Differential; Future  -     Comprehensive Metabolic Panel; Future  -     Lipid Panel; Future  -     TSH; Future  -     T4, Free; Future    2. GERD without esophagitis  Assessment & Plan:  Doing well with omeprazole.  Refilled    Orders:  -     omeprazole (priLOSEC) 20 MG capsule; Take 1 capsule by mouth Daily.  Dispense: 90 capsule; Refill: 1    3. Hyperlipidemia, mixed  Assessment & Plan:  Awaiting check on fasting labs before follow-up visit and physical next month together    Orders:  -     CBC Auto Differential; Future  -     Comprehensive Metabolic Panel; Future  -     Lipid Panel; Future  -     TSH; Future  -     T4, Free; Future    4. Diabetes mellitus screening  -     Hemoglobin A1c; Future    5. Prostate cancer screening  -     PSA Screen; Future    6. Seasonal allergic rhinitis due to pollen  Assessment & Plan:  Doing well with current regimen and does have Singulair, Flonase, and as needed albuterol to restart if needed.      7. Overweight (BMI 25.0-29.9)  Assessment & Plan:  Patient's (Body mass index is 28.04 kg/m².) indicates that they are overweight with health conditions that include GERD . Weight is  improving with lifestyle modifications. BMI is is above average; BMI management plan is completed. We discussed portion control and increasing exercise.                      Follow Up  Return in about 4 weeks (around 10/19/2023) for Fasting labs 1 week before apt (Drink water).    Christopher Bagley MD

## 2023-09-21 NOTE — ASSESSMENT & PLAN NOTE
Patient's (Body mass index is 28.04 kg/m².) indicates that they are overweight with health conditions that include GERD . Weight is improving with lifestyle modifications. BMI is is above average; BMI management plan is completed. We discussed portion control and increasing exercise.

## 2023-09-21 NOTE — ASSESSMENT & PLAN NOTE
Hypertension is worsening.  Regular aerobic exercise.  Medication changes per orders.  Blood pressure will be reassessed in 4 weeks.    Reviewed blood pressures from home and discussed repeat blood pressure to remain elevated.  We will change from Norvasc to Exforge.  Recheck at follow-up in 1 month.  He will plan to get fasting labs up-to-date before his follow-up visit

## 2023-09-21 NOTE — ASSESSMENT & PLAN NOTE
Doing well with current regimen and does have Singulair, Flonase, and as needed albuterol to restart if needed.

## 2023-10-16 ENCOUNTER — LAB (OUTPATIENT)
Dept: FAMILY MEDICINE CLINIC | Facility: CLINIC | Age: 58
End: 2023-10-16
Payer: COMMERCIAL

## 2023-10-16 DIAGNOSIS — Z13.1 DIABETES MELLITUS SCREENING: ICD-10-CM

## 2023-10-16 DIAGNOSIS — E78.2 HYPERLIPIDEMIA, MIXED: ICD-10-CM

## 2023-10-16 DIAGNOSIS — Z12.5 PROSTATE CANCER SCREENING: ICD-10-CM

## 2023-10-16 DIAGNOSIS — I10 HYPERTENSION, ESSENTIAL: Chronic | ICD-10-CM

## 2023-10-17 LAB
ALBUMIN SERPL-MCNC: 4.9 G/DL (ref 3.8–4.9)
ALBUMIN/GLOB SERPL: 2.3 {RATIO} (ref 1.2–2.2)
ALP SERPL-CCNC: 83 IU/L (ref 44–121)
ALT SERPL-CCNC: 27 IU/L (ref 0–44)
AST SERPL-CCNC: 17 IU/L (ref 0–40)
BASOPHILS # BLD AUTO: 0 X10E3/UL (ref 0–0.2)
BASOPHILS NFR BLD AUTO: 1 %
BILIRUB SERPL-MCNC: 0.5 MG/DL (ref 0–1.2)
BUN SERPL-MCNC: 17 MG/DL (ref 6–24)
BUN/CREAT SERPL: 17 (ref 9–20)
CALCIUM SERPL-MCNC: 10 MG/DL (ref 8.7–10.2)
CHLORIDE SERPL-SCNC: 101 MMOL/L (ref 96–106)
CHOLEST SERPL-MCNC: 298 MG/DL (ref 100–199)
CO2 SERPL-SCNC: 24 MMOL/L (ref 20–29)
CREAT SERPL-MCNC: 1.02 MG/DL (ref 0.76–1.27)
EGFRCR SERPLBLD CKD-EPI 2021: 85 ML/MIN/1.73
EOSINOPHIL # BLD AUTO: 0.2 X10E3/UL (ref 0–0.4)
EOSINOPHIL NFR BLD AUTO: 3 %
ERYTHROCYTE [DISTWIDTH] IN BLOOD BY AUTOMATED COUNT: 13.2 % (ref 11.6–15.4)
GLOBULIN SER CALC-MCNC: 2.1 G/DL (ref 1.5–4.5)
GLUCOSE SERPL-MCNC: 103 MG/DL (ref 70–99)
HBA1C MFR BLD: 5.7 % (ref 4.8–5.6)
HCT VFR BLD AUTO: 47.5 % (ref 37.5–51)
HDLC SERPL-MCNC: 39 MG/DL
HGB BLD-MCNC: 16 G/DL (ref 13–17.7)
IMM GRANULOCYTES # BLD AUTO: 0 X10E3/UL (ref 0–0.1)
IMM GRANULOCYTES NFR BLD AUTO: 0 %
LDLC SERPL CALC-MCNC: 137 MG/DL (ref 0–99)
LYMPHOCYTES # BLD AUTO: 2.4 X10E3/UL (ref 0.7–3.1)
LYMPHOCYTES NFR BLD AUTO: 40 %
MCH RBC QN AUTO: 30.4 PG (ref 26.6–33)
MCHC RBC AUTO-ENTMCNC: 33.7 G/DL (ref 31.5–35.7)
MCV RBC AUTO: 90 FL (ref 79–97)
MONOCYTES # BLD AUTO: 0.5 X10E3/UL (ref 0.1–0.9)
MONOCYTES NFR BLD AUTO: 8 %
NEUTROPHILS # BLD AUTO: 3 X10E3/UL (ref 1.4–7)
NEUTROPHILS NFR BLD AUTO: 48 %
PLATELET # BLD AUTO: 249 X10E3/UL (ref 150–450)
POTASSIUM SERPL-SCNC: 4.3 MMOL/L (ref 3.5–5.2)
PROT SERPL-MCNC: 7 G/DL (ref 6–8.5)
PSA SERPL-MCNC: 2 NG/ML (ref 0–4)
RBC # BLD AUTO: 5.27 X10E6/UL (ref 4.14–5.8)
SODIUM SERPL-SCNC: 141 MMOL/L (ref 134–144)
T4 FREE SERPL-MCNC: 1.4 NG/DL (ref 0.82–1.77)
TRIGL SERPL-MCNC: 643 MG/DL (ref 0–149)
TSH SERPL DL<=0.005 MIU/L-ACNC: 2.59 UIU/ML (ref 0.45–4.5)
VLDLC SERPL CALC-MCNC: 122 MG/DL (ref 5–40)
WBC # BLD AUTO: 6.1 X10E3/UL (ref 3.4–10.8)

## 2023-10-23 ENCOUNTER — OFFICE VISIT (OUTPATIENT)
Dept: FAMILY MEDICINE CLINIC | Facility: CLINIC | Age: 58
End: 2023-10-23
Payer: COMMERCIAL

## 2023-10-23 VITALS
SYSTOLIC BLOOD PRESSURE: 132 MMHG | WEIGHT: 178.7 LBS | HEART RATE: 72 BPM | OXYGEN SATURATION: 95 % | BODY MASS INDEX: 28.05 KG/M2 | DIASTOLIC BLOOD PRESSURE: 74 MMHG | HEIGHT: 67 IN

## 2023-10-23 DIAGNOSIS — Z00.00 GENERAL MEDICAL EXAM: Primary | ICD-10-CM

## 2023-10-23 DIAGNOSIS — I10 HYPERTENSION, ESSENTIAL: Chronic | ICD-10-CM

## 2023-10-23 DIAGNOSIS — E78.2 HYPERLIPIDEMIA, MIXED: ICD-10-CM

## 2023-10-23 DIAGNOSIS — R73.9 HYPERGLYCEMIA: ICD-10-CM

## 2023-10-23 DIAGNOSIS — K21.9 GERD WITHOUT ESOPHAGITIS: Chronic | ICD-10-CM

## 2023-10-23 DIAGNOSIS — E66.3 OVERWEIGHT (BMI 25.0-29.9): ICD-10-CM

## 2023-10-23 RX ORDER — OMEPRAZOLE 20 MG/1
20 CAPSULE, DELAYED RELEASE ORAL DAILY
Qty: 90 CAPSULE | Refills: 1 | Status: SHIPPED | OUTPATIENT
Start: 2023-10-23

## 2023-10-23 RX ORDER — PRAVASTATIN SODIUM 40 MG
40 TABLET ORAL DAILY
Qty: 90 TABLET | Refills: 1 | Status: SHIPPED | OUTPATIENT
Start: 2023-10-23

## 2023-10-23 RX ORDER — AMLODIPINE AND VALSARTAN 5; 320 MG/1; MG/1
1 TABLET ORAL DAILY
Qty: 90 TABLET | Refills: 1 | Status: SHIPPED | OUTPATIENT
Start: 2023-10-23

## 2023-10-23 NOTE — LETTER
October 23, 2023     Patient: Edward Knowles   YOB: 1965   Date of Visit: 10/23/2023       To Whom It May Concern:    Edward Knowles is a patient under my medical care with a history of hypertension and white coat HTN and his blood pressure has been well controlled with his current medication.    His blood pressure in the office today after repeat check was 132/74 today and home blood pressure readings reviewed in the office today are as follows: 123/76, 138/83, 137/86, 125/83, 128/82, 130/71, 124/81, 132/88, 129/80.    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         Sincerely,        Christopher Bagley MD

## 2023-10-23 NOTE — ASSESSMENT & PLAN NOTE
Patient's (Body mass index is 27.99 kg/m².) indicates that they are overweight with health conditions that include GERD . Weight is improving with lifestyle modifications. BMI is is above average; BMI management plan is completed. We discussed portion control and increasing exercise.

## 2023-10-23 NOTE — PROGRESS NOTES
"Chief Complaint  Follow-up, Hypertension, and Annual Exam    Subjective    History of Present Illness:  Edward Knowles is a 57 y.o. male who presents today for checkup visit and to followup after bp medication adjustment last month.    He has done very well with change to Exforge but does still have some home blood pressure readings that have been over 140/90.  He is interested in dose adjustment with Exforge.  No problems or side effects.    Declines vaccination update.    Labs did show significant hyperlipidemia and he did try Crestor in the past and had joint pains.  He is willing to try Pravachol.    GERD stable with omeprazole.    Mild glucose and A1c elevation reviewed and he is can work on diet and exercise efforts.    Declines colonoscopy.  Cologuard - October 10, 2022 with plan to repeat in 3 years (October 2025).    Hepatitis C screening - October 2022.    Screening PSA up-to-date with lab work October 2023    Objective   Vital Signs:   /74   Pulse 72   Ht 170.2 cm (67\")   Wt 81.1 kg (178 lb 11.2 oz)   SpO2 95%   BMI 27.99 kg/m²     Review of Systems   Constitutional:  Negative for appetite change, chills and fever.   HENT:  Negative for hearing loss.    Eyes:  Negative for blurred vision.   Respiratory:  Negative for chest tightness.    Cardiovascular:  Negative for chest pain.   Gastrointestinal:  Negative for abdominal pain.   Musculoskeletal:  Negative for gait problem.   Skin:  Negative for rash.   Psychiatric/Behavioral:  Negative for depressed mood.        Past History:  Medical History: has a past medical history of Allergic rhinitis, Benign essential hypertension, Blood chemistry abnormality, Erectile dysfunction, Gastroesophageal reflux, and Mixed hyperlipidemia.   Surgical History: has no past surgical history on file.   Family History: family history includes Other in his father.   Social History: reports that he has never smoked. He has never used smokeless tobacco. He reports current " Feeling anxiety. \"borrowed friends \"propanolol. Drinking ETOH w/ it. Read Dr. Placido Last and saw warning could lower BP. Now concerned. Last drink a hour ago.  Denies SI/HI alcohol use. He reports that he does not use drugs.      Current Outpatient Medications:     albuterol (PROVENTIL) (2.5 MG/3ML) 0.083% nebulizer solution, Take 2.5 mg by nebulization Every 4 (Four) Hours As Needed for Wheezing or Shortness of Air., Disp: 100 mL, Rfl: 12    albuterol sulfate  (90 Base) MCG/ACT inhaler, Inhale 2 puffs Every 4 (Four) Hours As Needed for Wheezing., Disp: 18 g, Rfl: 0    fluticasone (FLONASE) 50 MCG/ACT nasal spray, 2 sprays into the nostril(s) as directed by provider Daily., Disp: 48 g, Rfl: 1    montelukast (Singulair) 10 MG tablet, Take 1 tablet by mouth Every Night., Disp: 90 tablet, Rfl: 1    Nebulizers (Compressor Nebulizer) misc, 1 each 4 (Four) Times a Day. Nebulizer and supplies (mask and tubing), Disp: 1 each, Rfl: 0    omeprazole (priLOSEC) 20 MG capsule, Take 1 capsule by mouth Daily., Disp: 90 capsule, Rfl: 1    amLODIPine-valsartan (Exforge) 5-320 MG per tablet, Take 1 tablet by mouth Daily., Disp: 90 tablet, Rfl: 1    pravastatin (Pravachol) 40 MG tablet, Take 1 tablet by mouth Daily. For cholesterol, heart protection, and stroke prevention, Disp: 90 tablet, Rfl: 1    Allergies: Patient has no known allergies.    Physical Exam  Constitutional:       Appearance: Normal appearance.   HENT:      Head: Normocephalic.      Right Ear: External ear normal.      Left Ear: External ear normal.      Nose: Nose normal.   Eyes:      Pupils: Pupils are equal, round, and reactive to light.   Cardiovascular:      Rate and Rhythm: Normal rate and regular rhythm.      Heart sounds: Normal heart sounds.   Pulmonary:      Effort: Pulmonary effort is normal.      Breath sounds: Normal breath sounds.   Musculoskeletal:         General: Normal range of motion.      Cervical back: Normal range of motion.   Skin:     General: Skin is warm and dry.   Neurological:      General: No focal deficit present.      Mental Status: He is alert.   Psychiatric:         Mood and Affect: Mood normal.          Behavior: Behavior normal.         Thought Content: Thought content normal.          Result Review                   Assessment and Plan  Diagnoses and all orders for this visit:    1. General medical exam (Primary)  Assessment & Plan:  Discussed together health maintenance and screening along with vaccination options and healthy diet and exercise habits as part of the preventative counseling at their physical exam today.       2. Hypertension, essential  Assessment & Plan:  Hypertension is improving with treatment.  Medication changes per orders.  Blood pressure will be reassessed at the next regular appointment.    Orders:  -     amLODIPine-valsartan (Exforge) 5-320 MG per tablet; Take 1 tablet by mouth Daily.  Dispense: 90 tablet; Refill: 1    3. GERD without esophagitis  Assessment & Plan:  Doing well with omeprazole.  Refilled    Orders:  -     omeprazole (priLOSEC) 20 MG capsule; Take 1 capsule by mouth Daily.  Dispense: 90 capsule; Refill: 1    4. Hyperlipidemia, mixed  Assessment & Plan:  Lipid abnormalities are worsening.  Pharmacotherapy as ordered.  Lipids will be reassessed in 6 months.    Orders:  -     Comprehensive Metabolic Panel; Future  -     Lipid Panel; Future  -     pravastatin (Pravachol) 40 MG tablet; Take 1 tablet by mouth Daily. For cholesterol, heart protection, and stroke prevention  Dispense: 90 tablet; Refill: 1    5. Hyperglycemia  Assessment & Plan:  Reviewed together lab work and discussed diet, exercise, and weight loss efforts.  We will recheck his A1c with fasting labs in 3 months    Orders:  -     Hemoglobin A1c; Future    6. Overweight (BMI 25.0-29.9)  Assessment & Plan:  Patient's (Body mass index is 27.99 kg/m².) indicates that they are overweight with health conditions that include GERD . Weight is improving with lifestyle modifications. BMI is is above average; BMI management plan is completed. We discussed portion control and increasing exercise.                       Follow Up  Return in about 6 months (around 4/23/2024) for Med recheck.    Christopher Bagley MD

## 2023-10-23 NOTE — ASSESSMENT & PLAN NOTE
Reviewed together lab work and discussed diet, exercise, and weight loss efforts.  We will recheck his A1c with fasting labs in 3 months

## 2024-04-05 DIAGNOSIS — K21.9 GERD WITHOUT ESOPHAGITIS: Chronic | ICD-10-CM

## 2024-04-05 DIAGNOSIS — I10 HYPERTENSION, ESSENTIAL: Chronic | ICD-10-CM

## 2024-04-05 RX ORDER — OMEPRAZOLE 20 MG/1
20 CAPSULE, DELAYED RELEASE ORAL DAILY
Qty: 90 CAPSULE | Refills: 3 | Status: SHIPPED | OUTPATIENT
Start: 2024-04-05

## 2024-04-05 RX ORDER — AMLODIPINE AND VALSARTAN 5; 320 MG/1; MG/1
1 TABLET ORAL DAILY
Qty: 90 TABLET | Refills: 3 | Status: SHIPPED | OUTPATIENT
Start: 2024-04-05

## 2024-04-08 ENCOUNTER — OFFICE VISIT (OUTPATIENT)
Dept: FAMILY MEDICINE CLINIC | Facility: CLINIC | Age: 59
End: 2024-04-08
Payer: COMMERCIAL

## 2024-04-08 VITALS
DIASTOLIC BLOOD PRESSURE: 90 MMHG | WEIGHT: 178 LBS | BODY MASS INDEX: 27.94 KG/M2 | HEART RATE: 103 BPM | OXYGEN SATURATION: 94 % | SYSTOLIC BLOOD PRESSURE: 140 MMHG | HEIGHT: 67 IN

## 2024-04-08 DIAGNOSIS — J20.9 ACUTE BRONCHITIS WITH BRONCHOSPASM: Primary | ICD-10-CM

## 2024-04-08 PROCEDURE — 96372 THER/PROPH/DIAG INJ SC/IM: CPT | Performed by: PHYSICIAN ASSISTANT

## 2024-04-08 PROCEDURE — 99213 OFFICE O/P EST LOW 20 MIN: CPT | Performed by: PHYSICIAN ASSISTANT

## 2024-04-08 RX ORDER — PREDNISONE 20 MG/1
TABLET ORAL
Qty: 20 TABLET | Refills: 0 | Status: SHIPPED | OUTPATIENT
Start: 2024-04-08 | End: 2024-04-18

## 2024-04-08 RX ORDER — CETIRIZINE HYDROCHLORIDE 10 MG/1
10 TABLET ORAL DAILY
COMMUNITY
Start: 2024-03-27 | End: 2024-04-08

## 2024-04-08 RX ORDER — DEXTROMETHORPHAN HYDROBROMIDE AND PROMETHAZINE HYDROCHLORIDE 15; 6.25 MG/5ML; MG/5ML
5 SYRUP ORAL 4 TIMES DAILY PRN
Qty: 240 ML | Refills: 0 | Status: SHIPPED | OUTPATIENT
Start: 2024-04-08

## 2024-04-08 RX ORDER — DOXYCYCLINE 100 MG/1
100 CAPSULE ORAL 2 TIMES DAILY
Qty: 20 CAPSULE | Refills: 0 | Status: SHIPPED | OUTPATIENT
Start: 2024-04-08 | End: 2024-04-08

## 2024-04-08 RX ORDER — TRIAMCINOLONE ACETONIDE 40 MG/ML
40 INJECTION, SUSPENSION INTRA-ARTICULAR; INTRAMUSCULAR ONCE
Status: COMPLETED | OUTPATIENT
Start: 2024-04-08 | End: 2024-04-08

## 2024-04-08 RX ORDER — DOXYCYCLINE 100 MG/1
100 CAPSULE ORAL 2 TIMES DAILY
Qty: 20 CAPSULE | Refills: 0 | Status: SHIPPED | OUTPATIENT
Start: 2024-04-08

## 2024-04-08 RX ORDER — DEXTROMETHORPHAN HYDROBROMIDE AND PROMETHAZINE HYDROCHLORIDE 15; 6.25 MG/5ML; MG/5ML
5 SYRUP ORAL 4 TIMES DAILY PRN
Qty: 240 ML | Refills: 0 | Status: SHIPPED | OUTPATIENT
Start: 2024-04-08 | End: 2024-04-08 | Stop reason: SDUPTHER

## 2024-04-08 RX ORDER — PREDNISONE 20 MG/1
TABLET ORAL
Qty: 20 TABLET | Refills: 0 | Status: SHIPPED | OUTPATIENT
Start: 2024-04-08 | End: 2024-04-08 | Stop reason: SDUPTHER

## 2024-04-08 RX ADMIN — TRIAMCINOLONE ACETONIDE 40 MG: 40 INJECTION, SUSPENSION INTRA-ARTICULAR; INTRAMUSCULAR at 10:18

## 2024-04-08 NOTE — PROGRESS NOTES
"Chief Complaint  Headache (Tested for covid flu and strep a week ago all negative ), Cough (Started a month ago ), Sore Throat, and Shortness of Breath    Subjective          Edward Knowles presents to [facility]    Headache  Cough  Associated symptoms include headaches, a sore throat, shortness of breath and wheezing. Pertinent negatives include no chest pain, chills, ear pain or fever.   Sore Throat   Associated symptoms include congestion, coughing, headaches and shortness of breath. Pertinent negatives include no abdominal pain, diarrhea, ear pain, swollen glands or vomiting.   Shortness of Breath  Associated symptoms include headaches, a sore throat and wheezing. Pertinent negatives include no abdominal pain, chest pain, ear pain, fever, leg swelling, swollen glands or vomiting.    patient is here today for upper respiratory symptoms that have been going on for about a month.  He has tested for COVID flu and strep last week and they were all negative.  He is experience sore throat, hoarseness some shortness of breath and wheezing and headache.  He does have some postnasal drip as well.  He has not had fever or chills.    Objective   Vital Signs:   /90   Pulse 103   Ht 170.2 cm (67\")   Wt 80.7 kg (178 lb)   SpO2 94%   BMI 27.88 kg/m²     Body mass index is 27.88 kg/m².    Review of Systems   Constitutional:  Negative for chills, fatigue and fever.   HENT:  Positive for congestion and sore throat. Negative for ear pain, sinus pressure and swollen glands.    Eyes:  Negative for blurred vision.   Respiratory:  Positive for cough, shortness of breath and wheezing. Negative for chest tightness.    Cardiovascular:  Negative for chest pain, palpitations and leg swelling.   Gastrointestinal:  Negative for abdominal pain, constipation, diarrhea, nausea and vomiting.   Neurological:  Negative for dizziness, tremors and headache.   Psychiatric/Behavioral:  Negative for depressed mood. " The patient is not nervous/anxious.        Past History:  Medical History: has a past medical history of Allergic rhinitis, Benign essential hypertension, Blood chemistry abnormality, Erectile dysfunction, Gastroesophageal reflux, and Mixed hyperlipidemia.   Surgical History: has no past surgical history on file.   Family History: family history includes Other in his father.   Social History: reports that he has never smoked. He has never used smokeless tobacco. He reports current alcohol use. He reports that he does not use drugs.      Current Outpatient Medications:     albuterol (PROVENTIL) (2.5 MG/3ML) 0.083% nebulizer solution, Take 2.5 mg by nebulization Every 4 (Four) Hours As Needed for Wheezing or Shortness of Air., Disp: 100 mL, Rfl: 12    albuterol sulfate  (90 Base) MCG/ACT inhaler, Inhale 2 puffs Every 4 (Four) Hours As Needed for Wheezing., Disp: 18 g, Rfl: 0    amLODIPine-valsartan (EXFORGE) 5-320 MG per tablet, TAKE 1 TABLET DAILY, Disp: 90 tablet, Rfl: 3    doxycycline (MONODOX) 100 MG capsule, Take 1 capsule by mouth 2 (Two) Times a Day., Disp: 20 capsule, Rfl: 0    Nebulizers (Compressor Nebulizer) misc, 1 each 4 (Four) Times a Day. Nebulizer and supplies (mask and tubing), Disp: 1 each, Rfl: 0    omeprazole (priLOSEC) 20 MG capsule, TAKE 1 CAPSULE DAILY, Disp: 90 capsule, Rfl: 3    predniSONE (DELTASONE) 20 MG tablet, Take 3 tablets by mouth Daily for 3 days, THEN 2 tablets Daily for 3 days, THEN 1 tablet Daily for 5 days., Disp: 20 tablet, Rfl: 0    promethazine-dextromethorphan (PROMETHAZINE-DM) 6.25-15 MG/5ML syrup, Take 5 mL by mouth 4 (Four) Times a Day As Needed for Cough., Disp: 240 mL, Rfl: 0    fluticasone (FLONASE) 50 MCG/ACT nasal spray, 2 sprays into the nostril(s) as directed by provider Daily. (Patient not taking: Reported on 4/8/2024), Disp: 48 g, Rfl: 1    montelukast (Singulair) 10 MG tablet, Take 1 tablet by mouth Every Night. (Patient not taking: Reported on 4/8/2024),  Disp: 90 tablet, Rfl: 1  No current facility-administered medications for this visit.    Allergies: Patient has no known allergies.    Physical Exam  Constitutional:       Appearance: Normal appearance.   HENT:      Right Ear: Tympanic membrane, ear canal and external ear normal.      Left Ear: Tympanic membrane, ear canal and external ear normal.   Cardiovascular:      Rate and Rhythm: Normal rate and regular rhythm.      Heart sounds: Normal heart sounds. No murmur heard.  Pulmonary:      Effort: No respiratory distress.      Breath sounds: Wheezing and rhonchi present.   Neurological:      Mental Status: He is alert and oriented to person, place, and time.   Psychiatric:         Behavior: Behavior normal.          Result Review :                   Assessment and Plan    Diagnoses and all orders for this visit:    1. Acute bronchitis with bronchospasm (Primary)  Assessment & Plan:   I am recommending that he continue using his albuterol inhaler and I suggested that he take his Singulair nightly use the Flonase and the cetirizine all for symptomatic relief  I am him a steroid shot today and adding a steroid taper,  Promethazine DM and doxycycline.  He can return or call if symptoms persist or worsen.    Orders:  -     triamcinolone acetonide (KENALOG-40) injection 40 mg    Other orders  -     Discontinue: doxycycline (MONODOX) 100 MG capsule; Take 1 capsule by mouth 2 (Two) Times a Day.  Dispense: 20 capsule; Refill: 0  -     Discontinue: predniSONE (DELTASONE) 20 MG tablet; Take 3 tablets by mouth Daily for 3 days, THEN 2 tablets Daily for 3 days, THEN 1 tablet Daily for 5 days.  Dispense: 20 tablet; Refill: 0  -     Discontinue: promethazine-dextromethorphan (PROMETHAZINE-DM) 6.25-15 MG/5ML syrup; Take 5 mL by mouth 4 (Four) Times a Day As Needed for Cough.  Dispense: 240 mL; Refill: 0  -     doxycycline (MONODOX) 100 MG capsule; Take 1 capsule by mouth 2 (Two) Times a Day.  Dispense: 20 capsule; Refill: 0  -      promethazine-dextromethorphan (PROMETHAZINE-DM) 6.25-15 MG/5ML syrup; Take 5 mL by mouth 4 (Four) Times a Day As Needed for Cough.  Dispense: 240 mL; Refill: 0  -     predniSONE (DELTASONE) 20 MG tablet; Take 3 tablets by mouth Daily for 3 days, THEN 2 tablets Daily for 3 days, THEN 1 tablet Daily for 5 days.  Dispense: 20 tablet; Refill: 0        Follow Up   Return if symptoms worsen or fail to improve.  Patient was given instructions and counseling regarding his condition or for health maintenance advice. Please see specific information pulled into the AVS if appropriate.     Holly Ford PA-C

## 2024-04-08 NOTE — ASSESSMENT & PLAN NOTE
I am recommending that he continue using his albuterol inhaler and I suggested that he take his Singulair nightly use the Flonase and the cetirizine all for symptomatic relief  I am him a steroid shot today and adding a steroid taper,  Promethazine DM and doxycycline.  He can return or call if symptoms persist or worsen.

## 2025-03-31 DIAGNOSIS — I10 HYPERTENSION, ESSENTIAL: Chronic | ICD-10-CM

## 2025-03-31 RX ORDER — AMLODIPINE AND VALSARTAN 5; 320 MG/1; MG/1
1 TABLET ORAL DAILY
Qty: 30 TABLET | Refills: 0 | Status: SHIPPED | OUTPATIENT
Start: 2025-03-31

## 2025-04-01 NOTE — TELEPHONE ENCOUNTER
Attempted to reach pt LVM notifying pt of refills and will need to schedule appt for further refills   Epistaxis

## 2025-04-03 DIAGNOSIS — K21.9 GERD WITHOUT ESOPHAGITIS: Chronic | ICD-10-CM

## 2025-04-03 RX ORDER — OMEPRAZOLE 20 MG/1
20 CAPSULE, DELAYED RELEASE ORAL DAILY
Qty: 30 CAPSULE | Refills: 0 | Status: SHIPPED | OUTPATIENT
Start: 2025-04-03

## 2025-04-15 ENCOUNTER — TELEPHONE (OUTPATIENT)
Dept: FAMILY MEDICINE CLINIC | Facility: CLINIC | Age: 60
End: 2025-04-15
Payer: COMMERCIAL

## 2025-04-15 NOTE — TELEPHONE ENCOUNTER
Patient called concerned with his hypertension, said its been running around 150/90 but he said that is when he was working in the yard and he does want to bring that # down however his main concern is with the increase in his medication it has caused severe foot pain with it no matter what he does and he thinks it is related to the medication increase, patient is wanting to know what  thinks and if he needs to be seen or med change before his appointment on 4/24 with Chika.

## 2025-04-15 NOTE — TELEPHONE ENCOUNTER
Please have him keep his appointment for followup - we have not seen him here in over a year.  Not sure where he established?

## 2025-04-24 ENCOUNTER — OFFICE VISIT (OUTPATIENT)
Dept: FAMILY MEDICINE CLINIC | Facility: CLINIC | Age: 60
End: 2025-04-24
Payer: COMMERCIAL

## 2025-04-24 VITALS
HEIGHT: 67 IN | BODY MASS INDEX: 28.02 KG/M2 | WEIGHT: 178.5 LBS | HEART RATE: 74 BPM | SYSTOLIC BLOOD PRESSURE: 136 MMHG | DIASTOLIC BLOOD PRESSURE: 70 MMHG | OXYGEN SATURATION: 95 %

## 2025-04-24 DIAGNOSIS — I10 HYPERTENSION, ESSENTIAL: Primary | Chronic | ICD-10-CM

## 2025-04-24 DIAGNOSIS — M79.671 PAIN IN BOTH FEET: ICD-10-CM

## 2025-04-24 DIAGNOSIS — M79.672 PAIN IN BOTH FEET: ICD-10-CM

## 2025-04-24 PROCEDURE — 99214 OFFICE O/P EST MOD 30 MIN: CPT | Performed by: PHYSICIAN ASSISTANT

## 2025-04-24 RX ORDER — AMLODIPINE AND VALSARTAN 5; 160 MG/1; MG/1
1 TABLET ORAL DAILY
Qty: 30 TABLET | Refills: 1 | Status: SHIPPED | OUTPATIENT
Start: 2025-04-24 | End: 2026-04-24

## 2025-04-24 RX ORDER — METOPROLOL SUCCINATE 25 MG/1
25 TABLET, EXTENDED RELEASE ORAL DAILY
Qty: 30 TABLET | Refills: 1 | Status: SHIPPED | OUTPATIENT
Start: 2025-04-24

## 2025-04-24 NOTE — PROGRESS NOTES
Office Note     Name: Edward Knowles    : 1965     MRN: [id number]     Chief Complaint  Medication Recheck (High blood pressure, foot pain)    Subjective   Patient or patient representative verbalized consent for the use of Ambient Listening during the visit with  Chika Teague PA-C for chart documentation. 2025  15:11 EDT      Edward Knowles is a 59 y.o. male.     The patient presents for evaluation of foot pain and hypertension.    Foot pain began 1-2 years ago, coinciding with an increased dosage of valsartan. Despite changing jobs to reduce standing, the pain persists. Advil has been ineffective, and new shoes provide only partial relief. Pain persists until late at night.    Blood pressure was previously elevated, with occasional diastolic readings in the 90s.  Levels have recently been in the 120s and 130s systolic in the 80s and 90s diastolic.  Under Dr. Bagley's care, blood pressure has improved. Home monitoring shows variations based on activity. 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 White coat syndrome causes elevated readings in clinical settings. Patient is open to alternative medications for better gum health and less foot pain. Vision has improved with stabilized blood pressure. Recent eye exam and positive dental report noted, with concern about amlodipine affecting gums.    Currently taking omeprazole and occasionally uses medication for bronchitis or colds.    SOCIAL HISTORY  - No smoking  - No alcohol use    Review of Systems:   Review of Systems   Eyes:  Negative for blurred vision.   Cardiovascular:  Negative for chest pain and palpitations.   All other systems reviewed and are negative.      Past Medical History:   Past Medical History:   Diagnosis Date    Allergic rhinitis     Benign essential hypertension     Blood chemistry abnormality     Erectile dysfunction     Gastroesophageal reflux     Mixed hyperlipidemia        Past Surgical History: History  "reviewed. No pertinent surgical history.    Family History:   Family History   Problem Relation Age of Onset    Other Father         brain aneursym       Social History:   Social History     Socioeconomic History    Marital status:    Tobacco Use    Smoking status: Never    Smokeless tobacco: Never   Vaping Use    Vaping status: Never Used   Substance and Sexual Activity    Alcohol use: Yes    Drug use: Never    Sexual activity: Yes       Immunizations:   Immunization History   Administered Date(s) Administered    Tdap 05/15/2014        Medications:     Current Outpatient Medications:     albuterol (PROVENTIL) (2.5 MG/3ML) 0.083% nebulizer solution, Take 2.5 mg by nebulization Every 4 (Four) Hours As Needed for Wheezing or Shortness of Air., Disp: 100 mL, Rfl: 12    albuterol sulfate  (90 Base) MCG/ACT inhaler, Inhale 2 puffs Every 4 (Four) Hours As Needed for Wheezing., Disp: 18 g, Rfl: 0    amLODIPine-valsartan (EXFORGE) 5-320 MG per tablet, TAKE 1 TABLET DAILY, Disp: 30 tablet, Rfl: 0    fluticasone (FLONASE) 50 MCG/ACT nasal spray, 2 sprays into the nostril(s) as directed by provider Daily., Disp: 48 g, Rfl: 1    Nebulizers (Compressor Nebulizer) misc, 1 each 4 (Four) Times a Day. Nebulizer and supplies (mask and tubing), Disp: 1 each, Rfl: 0    omeprazole (priLOSEC) 20 MG capsule, TAKE 1 CAPSULE DAILY, Disp: 30 capsule, Rfl: 0    promethazine-dextromethorphan (PROMETHAZINE-DM) 6.25-15 MG/5ML syrup, Take 5 mL by mouth 4 (Four) Times a Day As Needed for Cough., Disp: 240 mL, Rfl: 0    amLODIPine-valsartan (Exforge) 5-160 MG per tablet, Take 1 tablet by mouth Daily., Disp: 30 tablet, Rfl: 1    metoprolol succinate XL (TOPROL-XL) 25 MG 24 hr tablet, Take 1 tablet by mouth Daily., Disp: 30 tablet, Rfl: 1    Allergies:   No Known Allergies    Objective     Vital Signs  /70 (BP Location: Right arm, Patient Position: Sitting, Cuff Size: Large Adult)   Pulse 74   Ht 170.2 cm (67.01\")   Wt 81 kg " "(178 lb 8 oz)   SpO2 95%   BMI 27.95 kg/m²   Estimated body mass index is 27.95 kg/m² as calculated from the following:    Height as of this encounter: 170.2 cm (67.01\").    Weight as of this encounter: 81 kg (178 lb 8 oz).      Physical Exam  Constitutional:       Appearance: Normal appearance.   HENT:      Head: Normocephalic.      Right Ear: External ear normal.      Left Ear: External ear normal.      Nose: Nose normal.   Eyes:      Pupils: Pupils are equal, round, and reactive to light.   Cardiovascular:      Rate and Rhythm: Normal rate and regular rhythm.      Heart sounds: Normal heart sounds.   Pulmonary:      Effort: Pulmonary effort is normal.      Breath sounds: Normal breath sounds.   Musculoskeletal:         General: Normal range of motion.      Cervical back: Normal range of motion.   Skin:     General: Skin is warm and dry.   Neurological:      General: No focal deficit present.      Mental Status: He is alert.   Psychiatric:         Mood and Affect: Mood normal.         Behavior: Behavior normal.         Thought Content: Thought content normal.          Results:  No results found for this or any previous visit (from the past 24 hours).       Assessment and Plan       Diagnoses and all orders for this visit:    1. Hypertension, essential (Primary)  Assessment & Plan:  - Managed with amlodipine and valsartan.  - Adjust regimen by reducing valsartan and adding metoprolol.  - Maintain log of heart rate and blood pressure twice daily.   - Further adjustments if new regimen is ineffective.    Orders:  -     amLODIPine-valsartan (Exforge) 5-160 MG per tablet; Take 1 tablet by mouth Daily.  Dispense: 30 tablet; Refill: 1  -     metoprolol succinate XL (TOPROL-XL) 25 MG 24 hr tablet; Take 1 tablet by mouth Daily.  Dispense: 30 tablet; Refill: 1    2. Pain in both feet  Comments:  - Possible side effect since valsartan increased. Rx for Exforge with reduced valsartan.  - Add metoprolol to compensate for " reduced valsartan.        Follow Up  Return for recheck BP with PCP in 4-6 weeks.    Chika Teague PA-C  [facility]

## 2025-05-01 ENCOUNTER — TELEPHONE (OUTPATIENT)
Dept: FAMILY MEDICINE CLINIC | Facility: CLINIC | Age: 60
End: 2025-05-01
Payer: COMMERCIAL

## 2025-05-01 NOTE — TELEPHONE ENCOUNTER
He should be on the 5/320 dose of exforge and with heart-rate running under 50 he can cut back to 1/2 tab of metoprolol until his followup visit later this month

## 2025-05-01 NOTE — TELEPHONE ENCOUNTER
Spoke with patient and he saw Chika Denherlinda last month because of foot pain, that is why she decreased him to the 5/160 due to foot pain. He has been off of the 5/320 for 5 days and has not had any foot pain at all. Could this be the cause of it, he does not want to take the higher dose if his foot pain will come back. I told him to hold off for now until I spoke with you. Please advise.

## 2025-05-01 NOTE — TELEPHONE ENCOUNTER
Patient called in concerned about his heart rate and BP. He advised his heart rate got low last night (43bpm) around 2am while watching TV. He got up and walked around and it went up to 52. He was not light headed or dizzy.     He got up this morning and your BP was 145/101 and Heart rate is 75.      He is concerned to take his meds and wants to know if he should go back to the previous medication he was taking. He knows he needs to take something but wants to confirm with you all since his heart rate was so low.     Please advise.

## 2025-05-01 NOTE — TELEPHONE ENCOUNTER
Name: Edwrad Knowles      Relationship: Self        HUB PROVIDED THE RELAY MESSAGE FROM THE OFFICE      PATIENT: VOICED UNDERSTANDING AND HAS NO FURTHER QUESTIONS AT THIS TIME    ADDITIONAL INFORMATION:

## 2025-05-01 NOTE — TELEPHONE ENCOUNTER
Called patient and left message for patient to call back. HUB CAN RELAY     From. Dr. Bagley --No idea why that would cause his foot pain but with BP still higher and heart-rate low that would be my recommendation to be on exforge 5/320 and half tablet of metoprolol.

## 2025-05-10 NOTE — ASSESSMENT & PLAN NOTE
- Managed with amlodipine and valsartan.  - Adjust regimen by reducing valsartan and adding metoprolol.  - Maintain log of heart rate and blood pressure twice daily.   - Further adjustments if new regimen is ineffective.

## 2025-05-30 ENCOUNTER — TELEMEDICINE (OUTPATIENT)
Dept: FAMILY MEDICINE CLINIC | Facility: CLINIC | Age: 60
End: 2025-05-30
Payer: COMMERCIAL

## 2025-05-30 ENCOUNTER — TELEPHONE (OUTPATIENT)
Dept: FAMILY MEDICINE CLINIC | Facility: CLINIC | Age: 60
End: 2025-05-30

## 2025-05-30 VITALS — DIASTOLIC BLOOD PRESSURE: 85 MMHG | HEART RATE: 59 BPM | SYSTOLIC BLOOD PRESSURE: 130 MMHG

## 2025-05-30 DIAGNOSIS — I10 HYPERTENSION, ESSENTIAL: Primary | Chronic | ICD-10-CM

## 2025-05-30 PROCEDURE — 99214 OFFICE O/P EST MOD 30 MIN: CPT | Performed by: PHYSICIAN ASSISTANT

## 2025-05-30 RX ORDER — AMLODIPINE AND VALSARTAN 10; 160 MG/1; MG/1
1 TABLET ORAL DAILY
Qty: 30 TABLET | Refills: 0 | Status: SHIPPED | OUTPATIENT
Start: 2025-05-30 | End: 2026-05-30

## 2025-05-30 NOTE — TELEPHONE ENCOUNTER
----- Message from Chika Teague sent at 5/30/2025  1:42 PM EDT -----  Please contact patient to schedule BP f/u. Dr. Bagley is his PCP, but I can see him if needed.

## 2025-05-30 NOTE — TELEPHONE ENCOUNTER
Attempted to reach pt LVM for pt to schedule BP recheck in 4-6 weeks and if he was ok with seeing Chika for that visit

## 2025-05-30 NOTE — PROGRESS NOTES
Office Note     Name: Edward Knowles    : 1965     MRN: [id number]     Chief Complaint  Hypertension    Subjective   Patient or patient representative verbalized consent for the use of Ambient Listening during the visit with  Chika Teague PA-C for chart documentation. 2025  13:18 EDT    Patient was seen today through synchronous audio/video technology. Verbal consent was obtained. The patient was located at home. Chika Teague PA-C was located at [facility] in [place], [place]. Vitals signs were obtained by patient and recorded.      Edward Knowles is a 59 y.o. male.     The patient presents via virtual visit for evaluation of blood pressure management.    He has been monitoring his blood pressure and heart rate at home.  Initially he was taking metoprolol 25 mg along with the Exforge 5/160 mg.  On 2025, his readings were 135/81 mmHg and 59 bpm. On 2025, his readings were 138/80 mmHg and 43 bpm.  At that point he reduced his metoprolol to 12.5 mg after talking to Dr. Bagley, but he did not go back to the higher dose of the Exforge.  Subsequently, his heart rate increased to the 60s, with a blood pressure of 152/85 mmHg and a heart rate of 61 bpm, followed by a reading of 132/86 mmHg and a heart rate of 59 bpm. His most recent readings were 130/85 mmHg, 147/88 mmHg, and 126/75 mmHg, taken a few minutes prior to the consultation, earlier in the day, and a few nights ago respectively.    His foot pain has significantly improved since reducing valsartan. He maintains an active lifestyle, including regular exercise, and ensures proper footwear for his job, which involves extensive walking. He is currently on amlodipine 5/160 mg and metoprolol 12.5 mg, and his foot pain has resolved.     He is open to dietary modifications to manage his blood pressure and is uncertain about his target blood pressure and heart rate goals. He mentions a family history of blood pressure  issues, with his father having experienced a brain aneurysm, and expresses a desire to manage his blood pressure to prevent similar complications.    FAMILY HISTORY  - Father had hypertension and a brain aneurysm      Review of Systems:   Review of Systems   Eyes:  Negative for blurred vision.   Respiratory:  Negative for shortness of breath.    Cardiovascular:  Negative for chest pain and palpitations.       Past Medical History:   Past Medical History:   Diagnosis Date    Allergic rhinitis     Benign essential hypertension     Blood chemistry abnormality     Erectile dysfunction     Gastroesophageal reflux     Mixed hyperlipidemia        Past Surgical History: History reviewed. No pertinent surgical history.    Family History:   Family History   Problem Relation Age of Onset    Other Father         brain aneursym       Social History:   Social History     Socioeconomic History    Marital status:    Tobacco Use    Smoking status: Never    Smokeless tobacco: Never   Vaping Use    Vaping status: Never Used   Substance and Sexual Activity    Alcohol use: Yes    Drug use: Never    Sexual activity: Yes       Immunizations:   Immunization History   Administered Date(s) Administered    Tdap 05/15/2014        Medications:     Current Outpatient Medications:     albuterol (PROVENTIL) (2.5 MG/3ML) 0.083% nebulizer solution, Take 2.5 mg by nebulization Every 4 (Four) Hours As Needed for Wheezing or Shortness of Air., Disp: 100 mL, Rfl: 12    albuterol sulfate  (90 Base) MCG/ACT inhaler, Inhale 2 puffs Every 4 (Four) Hours As Needed for Wheezing., Disp: 18 g, Rfl: 0    fluticasone (FLONASE) 50 MCG/ACT nasal spray, 2 sprays into the nostril(s) as directed by provider Daily., Disp: 48 g, Rfl: 1    metoprolol succinate XL (TOPROL-XL) 25 MG 24 hr tablet, Take 1 tablet by mouth Daily. (Patient taking differently: Take 0.5 tablets by mouth Daily.), Disp: 30 tablet, Rfl: 1    Nebulizers (Compressor Nebulizer) misc, 1  "each 4 (Four) Times a Day. Nebulizer and supplies (mask and tubing), Disp: 1 each, Rfl: 0    omeprazole (priLOSEC) 20 MG capsule, TAKE 1 CAPSULE DAILY, Disp: 30 capsule, Rfl: 0    amLODIPine-valsartan (Exforge)  MG per tablet, Take 1 tablet by mouth Daily., Disp: 30 tablet, Rfl: 0    promethazine-dextromethorphan (PROMETHAZINE-DM) 6.25-15 MG/5ML syrup, Take 5 mL by mouth 4 (Four) Times a Day As Needed for Cough., Disp: 240 mL, Rfl: 0    Allergies:   No Known Allergies    Objective     Vital Signs  /85 Comment: Patient provided  Pulse 59 Comment: Patient provided  Estimated body mass index is 27.95 kg/m² as calculated from the following:    Height as of 4/24/25: 170.2 cm (67.01\").    Weight as of 4/24/25: 81 kg (178 lb 8 oz).        Physical Exam  Vitals and nursing note reviewed.   Constitutional:       Appearance: Normal appearance.   HENT:      Head: Normocephalic.   Pulmonary:      Effort: Pulmonary effort is normal.   Neurological:      Mental Status: He is alert and oriented to person, place, and time.   Psychiatric:         Mood and Affect: Mood normal.         Behavior: Behavior normal.            Results:  No results found for this or any previous visit (from the past 24 hours).       Assessment and Plan       Diagnoses and all orders for this visit:    1. Hypertension, essential (Primary)  Assessment & Plan:  - BP readings slightly elevated, target <120/80.  - HR improved to ~60 bpm after adjusting metoprolol to 12.5 mg and amlodipine to 5 mg.  - Increase amlodipine from 5 mg to 10 mg.   - Prescription for 30-day supply of Exforge 10 mg / 160 mg sent to pharmacy. Monitor for potential ankle or foot swelling and contact office if issues arise before next appointment.    Orders:  -     amLODIPine-valsartan (Exforge)  MG per tablet; Take 1 tablet by mouth Daily.  Dispense: 30 tablet; Refill: 0        Follow Up  Return for recheck BP in 4-6 weeks.    Chika Teague PA-C  [facility] Internal " [facility]

## 2025-05-30 NOTE — ASSESSMENT & PLAN NOTE
- BP readings slightly elevated, target <120/80.  - HR improved to ~60 bpm after adjusting metoprolol to 12.5 mg and amlodipine to 5 mg.  - Increase amlodipine from 5 mg to 10 mg.   - Prescription for 30-day supply of Exforge 10 mg / 160 mg sent to pharmacy. Monitor for potential ankle or foot swelling and contact office if issues arise before next appointment.

## 2025-06-23 ENCOUNTER — TELEMEDICINE (OUTPATIENT)
Dept: FAMILY MEDICINE CLINIC | Facility: CLINIC | Age: 60
End: 2025-06-23
Payer: COMMERCIAL

## 2025-06-23 VITALS — HEIGHT: 67 IN | BODY MASS INDEX: 26.68 KG/M2 | WEIGHT: 170 LBS

## 2025-06-23 DIAGNOSIS — I10 HYPERTENSION, ESSENTIAL: Chronic | ICD-10-CM

## 2025-06-23 DIAGNOSIS — J20.8 ACUTE BRONCHITIS DUE TO OTHER SPECIFIED ORGANISMS: ICD-10-CM

## 2025-06-23 DIAGNOSIS — K21.9 GERD WITHOUT ESOPHAGITIS: Chronic | ICD-10-CM

## 2025-06-23 PROCEDURE — 99214 OFFICE O/P EST MOD 30 MIN: CPT | Performed by: PHYSICIAN ASSISTANT

## 2025-06-23 RX ORDER — ALBUTEROL SULFATE 0.83 MG/ML
2.5 SOLUTION RESPIRATORY (INHALATION) EVERY 4 HOURS PRN
Qty: 100 ML | Refills: 1 | Status: SHIPPED | OUTPATIENT
Start: 2025-06-23

## 2025-06-23 RX ORDER — AMLODIPINE AND VALSARTAN 10; 160 MG/1; MG/1
1 TABLET ORAL DAILY
Qty: 90 TABLET | Refills: 1 | Status: SHIPPED | OUTPATIENT
Start: 2025-06-23 | End: 2026-06-23

## 2025-06-23 RX ORDER — METOPROLOL SUCCINATE 25 MG/1
12.5 TABLET, EXTENDED RELEASE ORAL DAILY
Qty: 45 TABLET | Refills: 1 | Status: SHIPPED | OUTPATIENT
Start: 2025-06-23

## 2025-06-23 RX ORDER — OMEPRAZOLE 20 MG/1
20 CAPSULE, DELAYED RELEASE ORAL DAILY
Qty: 90 CAPSULE | Refills: 1 | Status: SHIPPED | OUTPATIENT
Start: 2025-06-23

## 2025-06-23 RX ORDER — PREDNISONE 20 MG/1
TABLET ORAL
Qty: 20 TABLET | Refills: 0 | Status: SHIPPED | OUTPATIENT
Start: 2025-06-23 | End: 2025-07-04

## 2025-06-23 RX ORDER — ALBUTEROL SULFATE 90 UG/1
2 INHALANT RESPIRATORY (INHALATION) EVERY 4 HOURS PRN
Qty: 18 G | Refills: 0 | Status: SHIPPED | OUTPATIENT
Start: 2025-06-23

## 2025-06-23 NOTE — ASSESSMENT & PLAN NOTE
Provide patient with steroid taper and refilled his albuterol inhaler and neb solution.  Recommend he continue Flonase.  Has signs worsening symptoms advise ER should they occur.  He acknowledged understanding.

## 2025-06-23 NOTE — PROGRESS NOTES
"Chief Complaint  Med Management (Pt states current regimen for BP medication is working well and would like to have a refills.)    Subjective           Edward Knowles presents to [facility]  History of Present Illness  Patient reports today for follow-up secondary to having his blood pressure medication adjusted last visit.  Patient reports he has been taking his medication regularly and checking his blood pressure at home.  Reports most readings are below 130/80.  Reports his most recent blood pressure reading was 124/70.  Patient states he is taking amlodipine, losartan and metoprolol.  Would like to continue current regimen.    Patient reports for the past week he has been having bronchitis symptoms.  States he cut the grass 2 weeks ago and since then he has been having a burning sensation in his chest and cough.  Reports having promethazine.  States his albuterol inhaler is out and would like refills.  Patient reports that discomfort is more regular.  Denies any shortness of breath or difficulty breathing.  Denies any fever or chills.  Denies any known sick contacts.    Objective   Vital Signs:   Ht 170.2 cm (67.01\")   Wt 77.1 kg (170 lb)   BMI 26.62 kg/m²     Estimated body mass index is 26.62 kg/m² as calculated from the following:    Height as of this encounter: 170.2 cm (67.01\").    Weight as of this encounter: 77.1 kg (170 lb).     Physical Exam   Pulmonary/Chest: Effort normal.   Psychiatric: He has a normal mood and affect.     Result Review :                   Assessment and Plan      Diagnoses and all orders for this visit:    1. Hypertension, essential  -     amLODIPine-valsartan (Exforge)  MG per tablet; Take 1 tablet by mouth Daily. For blood pressure  Dispense: 90 tablet; Refill: 1  -     metoprolol succinate XL (TOPROL-XL) 25 MG 24 hr tablet; Take 0.5 tablets by mouth Daily.  Dispense: 45 tablet; Refill: 1    2. GERD without esophagitis  -     omeprazole " (priLOSEC) 20 MG capsule; Take 1 capsule by mouth Daily.  Dispense: 90 capsule; Refill: 1    3. Acute bronchitis due to other specified organisms  -     albuterol (PROVENTIL) (2.5 MG/3ML) 0.083% nebulizer solution; Take 2.5 mg by nebulization Every 4 (Four) Hours As Needed for Wheezing or Shortness of Air.  Dispense: 100 mL; Refill: 1  -     predniSONE (DELTASONE) 20 MG tablet; Take 3 tablets by mouth Daily for 3 days, THEN 2 tablets Daily for 3 days, THEN 1 tablet Daily for 5 days. With food  Dispense: 20 tablet; Refill: 0    4. Bronchitis  Comments:  Amoxil Tessalon albuterol inhaler went ahead and discussed with him about possibly taking COVID test because his wife was sick as well   Orders:  -     albuterol sulfate  (90 Base) MCG/ACT inhaler; Inhale 2 puffs Every 4 (Four) Hours As Needed for Wheezing.  Dispense: 18 g; Refill: 0        Follow Up     No follow-ups on file.  Patient was given instructions and counseling regarding his condition or for health maintenance advice. Please see specific information pulled into the AVS if appropriate.     Mode of Visit: Video  Location of patient: home  Location of provider: [facility] [place] [facility]  You have chosen to receive care through a telehealth visit.  The patient has signed the video visit consent form.  The visit included audio and video interaction. No technical issues occurred during this visit.